# Patient Record
Sex: MALE | Race: WHITE | NOT HISPANIC OR LATINO | URBAN - METROPOLITAN AREA
[De-identification: names, ages, dates, MRNs, and addresses within clinical notes are randomized per-mention and may not be internally consistent; named-entity substitution may affect disease eponyms.]

---

## 2022-02-25 LAB
AMB EXT HGBA1C: 9.6 %
AMB EXT MALB URINE CALC: 38 MG/24HR
AMB EXT MALB/CRE CALC: 42 UG/MG

## 2022-07-09 ENCOUNTER — TELEPHONE ENCOUNTER (OUTPATIENT)
Dept: URBAN - METROPOLITAN AREA CLINIC 121 | Facility: CLINIC | Age: 82
End: 2022-07-09

## 2022-07-09 RX ORDER — ATENOLOL 50 MG/1
TABLET ORAL ONCE A DAY
Refills: 0 | OUTPATIENT
Start: 2017-11-28 | End: 2018-01-25

## 2022-07-09 RX ORDER — ATORVASTATIN CALCIUM 20 MG/1
TABLET, FILM COATED ORAL ONCE A DAY
Refills: 0 | OUTPATIENT
Start: 2017-11-28 | End: 2018-01-25

## 2022-07-09 RX ORDER — ATENOLOL 50 MG/1
TABLET ORAL ONCE A DAY
Refills: 0 | OUTPATIENT
Start: 2018-01-25 | End: 2018-04-18

## 2022-07-09 RX ORDER — TRIAMTERENE AND HYDROCHLOROTHIAZIDE 37.5; 25 MG/1; MG/1
CAPSULE ORAL ONCE A DAY
Refills: 0 | OUTPATIENT
Start: 2018-01-25 | End: 2018-04-18

## 2022-07-09 RX ORDER — ATORVASTATIN CALCIUM 20 MG/1
TABLET, FILM COATED ORAL ONCE A DAY
Refills: 0 | OUTPATIENT
Start: 2018-01-25 | End: 2018-04-18

## 2022-07-09 RX ORDER — ASPIRIN 81 MG/1
TABLET, DELAYED RELEASE ORAL ONCE A DAY
Refills: 0 | OUTPATIENT
Start: 2017-11-28 | End: 2018-01-25

## 2022-07-09 RX ORDER — GLIMEPIRIDE 2 MG/1
TABLET ORAL ONCE A DAY
Refills: 0 | OUTPATIENT
Start: 2017-11-28 | End: 2018-01-25

## 2022-07-09 RX ORDER — GLIMEPIRIDE 2 MG/1
TABLET ORAL ONCE A DAY
Refills: 0 | OUTPATIENT
Start: 2018-01-25 | End: 2018-04-18

## 2022-07-09 RX ORDER — TRIAMTERENE AND HYDROCHLOROTHIAZIDE 37.5; 25 MG/1; MG/1
CAPSULE ORAL ONCE A DAY
Refills: 0 | OUTPATIENT
Start: 2017-11-28 | End: 2018-01-25

## 2022-07-09 RX ORDER — METFORMIN HCL 1000 MG/1
TABLET ORAL ONCE A DAY
Refills: 0 | OUTPATIENT
Start: 2017-11-28 | End: 2018-01-25

## 2022-07-09 RX ORDER — METFORMIN HCL 1000 MG/1
TABLET ORAL ONCE A DAY
Refills: 0 | OUTPATIENT
Start: 2018-01-25 | End: 2018-04-18

## 2022-07-09 RX ORDER — LINAGLIPTIN 5 MG/1
TABLET, FILM COATED ORAL ONCE A DAY
Refills: 0 | OUTPATIENT
Start: 2018-01-25 | End: 2018-04-18

## 2022-07-09 RX ORDER — OMEGA-3S/DHA/EPA/FISH OIL 980-1400MG
CAPSULE,DELAYED RELEASE (ENTERIC COATED) ORAL THREE TIMES A DAY
Refills: 0 | OUTPATIENT
Start: 2018-01-25 | End: 2018-04-18

## 2022-07-09 RX ORDER — OMEGA-3S/DHA/EPA/FISH OIL 980-1400MG
CAPSULE,DELAYED RELEASE (ENTERIC COATED) ORAL THREE TIMES A DAY
Refills: 0 | OUTPATIENT
Start: 2017-11-28 | End: 2018-01-25

## 2022-07-09 RX ORDER — ASPIRIN 81 MG/1
TABLET, DELAYED RELEASE ORAL ONCE A DAY
Refills: 0 | OUTPATIENT
Start: 2018-01-25 | End: 2018-04-18

## 2022-07-09 RX ORDER — LINAGLIPTIN 5 MG/1
TABLET, FILM COATED ORAL ONCE A DAY
Refills: 0 | OUTPATIENT
Start: 2017-11-28 | End: 2018-01-25

## 2022-07-10 ENCOUNTER — TELEPHONE ENCOUNTER (OUTPATIENT)
Dept: URBAN - METROPOLITAN AREA CLINIC 121 | Facility: CLINIC | Age: 82
End: 2022-07-10

## 2022-07-10 RX ORDER — ATORVASTATIN CALCIUM 20 MG/1
TABLET, FILM COATED ORAL ONCE A DAY
Refills: 0 | Status: ACTIVE | COMMUNITY
Start: 2018-04-18

## 2022-07-10 RX ORDER — OMEGA-3S/DHA/EPA/FISH OIL 980-1400MG
CAPSULE,DELAYED RELEASE (ENTERIC COATED) ORAL THREE TIMES A DAY
Refills: 0 | Status: ACTIVE | COMMUNITY
Start: 2018-04-18

## 2022-07-10 RX ORDER — ATENOLOL 50 MG/1
TABLET ORAL ONCE A DAY
Refills: 0 | Status: ACTIVE | COMMUNITY
Start: 2018-04-18

## 2022-07-10 RX ORDER — ASPIRIN 81 MG/1
TABLET, DELAYED RELEASE ORAL ONCE A DAY
Refills: 0 | Status: ACTIVE | COMMUNITY
Start: 2018-04-18

## 2022-07-10 RX ORDER — GLIMEPIRIDE 2 MG/1
TABLET ORAL ONCE A DAY
Refills: 0 | Status: ACTIVE | COMMUNITY
Start: 2018-04-18

## 2022-07-10 RX ORDER — TRIAMTERENE AND HYDROCHLOROTHIAZIDE 37.5; 25 MG/1; MG/1
CAPSULE ORAL ONCE A DAY
Refills: 0 | Status: ACTIVE | COMMUNITY
Start: 2018-04-18

## 2022-07-10 RX ORDER — METFORMIN HCL 1000 MG/1
TABLET ORAL ONCE A DAY
Refills: 0 | Status: ACTIVE | COMMUNITY
Start: 2018-04-18

## 2022-07-10 RX ORDER — LINAGLIPTIN 5 MG/1
TABLET, FILM COATED ORAL ONCE A DAY
Refills: 0 | Status: ACTIVE | COMMUNITY
Start: 2018-04-18

## 2022-09-08 ENCOUNTER — OFFICE VISIT (OUTPATIENT)
Dept: FAMILY MEDICINE CLINIC | Facility: CLINIC | Age: 82
End: 2022-09-08

## 2022-09-08 VITALS
TEMPERATURE: 98 F | DIASTOLIC BLOOD PRESSURE: 78 MMHG | SYSTOLIC BLOOD PRESSURE: 150 MMHG | HEIGHT: 71.5 IN | RESPIRATION RATE: 18 BRPM | HEART RATE: 86 BPM | BODY MASS INDEX: 31.5 KG/M2 | OXYGEN SATURATION: 98 % | WEIGHT: 230 LBS

## 2022-09-08 DIAGNOSIS — Z76.89 ENCOUNTER TO ESTABLISH CARE: ICD-10-CM

## 2022-09-08 DIAGNOSIS — E78.5 HYPERLIPIDEMIA, UNSPECIFIED HYPERLIPIDEMIA TYPE: ICD-10-CM

## 2022-09-08 DIAGNOSIS — I10 PRIMARY HYPERTENSION: Primary | ICD-10-CM

## 2022-09-08 DIAGNOSIS — E11.9 TYPE 2 DIABETES MELLITUS WITHOUT COMPLICATION, WITHOUT LONG-TERM CURRENT USE OF INSULIN (HCC): ICD-10-CM

## 2022-09-08 PROCEDURE — 99204 OFFICE O/P NEW MOD 45 MIN: CPT | Performed by: FAMILY MEDICINE

## 2022-09-08 RX ORDER — ASCORBIC ACID 500 MG
TABLET ORAL
COMMUNITY

## 2022-09-08 RX ORDER — FENOFIBRATE 145 MG/1
145 TABLET, COATED ORAL DAILY
COMMUNITY
Start: 2022-08-16

## 2022-09-08 RX ORDER — ATORVASTATIN CALCIUM 20 MG/1
20 TABLET, FILM COATED ORAL NIGHTLY
COMMUNITY
Start: 2022-07-25

## 2022-09-08 RX ORDER — ASPIRIN 325 MG
325 TABLET ORAL DAILY
COMMUNITY

## 2022-09-08 RX ORDER — AMOXICILLIN 500 MG
CAPSULE ORAL
COMMUNITY

## 2022-09-08 RX ORDER — ISOSORBIDE MONONITRATE 30 MG/1
30 TABLET, EXTENDED RELEASE ORAL DAILY
COMMUNITY
Start: 2022-09-01

## 2022-09-08 RX ORDER — METOPROLOL SUCCINATE 50 MG/1
50 TABLET, EXTENDED RELEASE ORAL DAILY
COMMUNITY
Start: 2022-08-21

## 2022-09-08 RX ORDER — MELATONIN
325
COMMUNITY

## 2022-09-08 RX ORDER — LEVOTHYROXINE SODIUM 0.03 MG/1
25 TABLET ORAL
COMMUNITY
Start: 2022-07-18

## 2022-09-08 RX ORDER — AMLODIPINE BESYLATE 5 MG/1
5 TABLET ORAL DAILY
COMMUNITY
Start: 2022-08-21

## 2022-09-08 RX ORDER — TRIAMTERENE AND HYDROCHLOROTHIAZIDE 37.5; 25 MG/1; MG/1
37.5 TABLET ORAL DAILY
COMMUNITY
Start: 2022-08-21

## 2022-09-08 RX ORDER — GLIPIZIDE 10 MG/1
10 TABLET ORAL 2 TIMES DAILY
COMMUNITY
Start: 2022-08-16

## 2022-09-19 ENCOUNTER — TELEPHONE (OUTPATIENT)
Dept: FAMILY MEDICINE CLINIC | Facility: CLINIC | Age: 82
End: 2022-09-19

## 2022-09-19 NOTE — TELEPHONE ENCOUNTER
Spouse notified. She does not know if he has ever been on lisinopril or losartan. Leaving for [place] in October. OV scheduled for labs and discuss meds.     Future Appointments   Date Time Provider Celsa Schreiber   10/3/2022  9:20 AM López Kelley MD [facility]FREDO Arias

## 2022-09-19 NOTE — TELEPHONE ENCOUNTER
Pt had bloodwork on June 15th @ [facility].  [facility] was going to send the results to PCP Dr Khushboo Molina in [place]. Pt called Dr Gabriel's office, they have not received the the bloodwork from [address] [place] [address],  [facility] [place]note 2] [address] called [employer], they have no record of it lab results. Pt's wife wants to know if we received labs from Dr Gabriel's office? ?

## 2022-09-19 NOTE — TELEPHONE ENCOUNTER
I got his labs his hba1c is high. His other labs shows cholesterol level normal. Liver and thyroid normal. B12 folate and iron normal. Kidney function mild elevated but stable. Blood count is normal.  I would recommend strict low carb diet and exercise. He can repeat hba1c in 1 month and if still elevated then consider adding meds. Also his urine albumin level mild elevated and  he is not on any ace1 for kidney protection in diabetis. Can you ask him if has been on any losartan lisinopril in past and any reason it was stopped. ? If not then I would recommend adding a acei low dose for kindye protection. If he is willing to start losartan schedule ov with me.

## 2022-10-03 ENCOUNTER — OFFICE VISIT (OUTPATIENT)
Dept: FAMILY MEDICINE CLINIC | Facility: CLINIC | Age: 82
End: 2022-10-03
Payer: MEDICARE

## 2022-10-03 VITALS
SYSTOLIC BLOOD PRESSURE: 120 MMHG | OXYGEN SATURATION: 98 % | BODY MASS INDEX: 32.2 KG/M2 | RESPIRATION RATE: 18 BRPM | WEIGHT: 230 LBS | HEART RATE: 106 BPM | HEIGHT: 71 IN | TEMPERATURE: 98 F | DIASTOLIC BLOOD PRESSURE: 80 MMHG

## 2022-10-03 DIAGNOSIS — E11.9 TYPE 2 DIABETES MELLITUS WITHOUT COMPLICATION, WITHOUT LONG-TERM CURRENT USE OF INSULIN (HCC): Primary | ICD-10-CM

## 2022-10-03 PROCEDURE — 99214 OFFICE O/P EST MOD 30 MIN: CPT | Performed by: FAMILY MEDICINE

## 2022-10-03 RX ORDER — LISINOPRIL 2.5 MG/1
2.5 TABLET ORAL DAILY
Qty: 90 TABLET | Refills: 1 | Status: SHIPPED | OUTPATIENT
Start: 2022-10-03

## 2022-11-28 RX ORDER — FENOFIBRATE 145 MG/1
145 TABLET, COATED ORAL DAILY
Qty: 90 TABLET | Refills: 0 | Status: SHIPPED | OUTPATIENT
Start: 2022-11-28

## 2022-11-28 NOTE — TELEPHONE ENCOUNTER
Cholesterol Medication Protocol Failed 11/28/2022 09:16 AM   Protocol Details  ALT < 80    ALT resulted within past year    Lipid panel within past 12 months    Appointment within past 12 or next 3 months     Diabetic Medication Protocol Failed 11/28/2022 09:16 AM   Protocol Details  HgBA1C procedure resulted in past 6 months    Last HgBA1C < 7.5    Microalbumin procedure in past 12 months or taking ACE/ARB    Appointment in past 6 or next 3 months     Labs scanned to chart from 2/23/22. We have never sent these - ok to send? No future appointments.

## 2022-12-09 RX ORDER — GLIPIZIDE 10 MG/1
10 TABLET ORAL 2 TIMES DAILY
Qty: 180 TABLET | Refills: 0 | Status: SHIPPED | OUTPATIENT
Start: 2022-12-09

## 2022-12-09 NOTE — TELEPHONE ENCOUNTER
glipiZIDE 10 MG Oral Tab  [address] [facility], [place] - [address] [facility] AT [address] [facility], [phone],

## 2022-12-09 NOTE — TELEPHONE ENCOUNTER
Last visit 10/03/2022  Last refill 08/16/2022   Diabetic Medication Protocol Failed 12/09/2022 09:16 AM   Protocol Details  HgBA1C procedure resulted in past 6 months    Last HgBA1C < 7.5    Microalbumin procedure in past 12 months or taking ACE/ARB    Appointment in past 6 or next 3 months

## 2022-12-12 RX ORDER — FENOFIBRATE 145 MG/1
TABLET, COATED ORAL
Qty: 90 TABLET | Refills: 0 | OUTPATIENT
Start: 2022-12-12

## 2022-12-12 NOTE — TELEPHONE ENCOUNTER
Cholesterol Medication Protocol Failed 12/11/2022 08:22 AM   Protocol Details  ALT < 80    ALT resulted within past year    Lipid panel within past 12 months    Appointment within past 12 or next 3 months     Last refill 11/28/22 #90  This request refused

## 2022-12-30 LAB
AMB EXT CHLORIDE: 97
AMB EXT CREATININE: 1.49 MG/DL
AMB EXT GLUCOSE: 322 MG/DL
AMB EXT GLUCOSE: 322 MG/DL
AMB EXT HGBA1C: 10.7 %
AMB EXT SODIUM: 133 MMOL/L

## 2023-01-04 ENCOUNTER — TELEPHONE (OUTPATIENT)
Dept: FAMILY MEDICINE CLINIC | Facility: CLINIC | Age: 83
End: 2023-01-04

## 2023-01-04 DIAGNOSIS — E11.9 TYPE 2 DIABETES MELLITUS WITHOUT COMPLICATION, WITHOUT LONG-TERM CURRENT USE OF INSULIN (HCC): Primary | ICD-10-CM

## 2023-01-04 RX ORDER — INSULIN GLARGINE 100 [IU]/ML
10 INJECTION, SOLUTION SUBCUTANEOUS NIGHTLY
Qty: 10 ML | Refills: 0 | Status: SHIPPED | OUTPATIENT
Start: 2023-01-04 | End: 2023-01-04

## 2023-01-04 RX ORDER — INSULIN GLARGINE 100 [IU]/ML
10 INJECTION, SOLUTION SUBCUTANEOUS NIGHTLY
Qty: 15 ML | Refills: 0 | Status: SHIPPED | OUTPATIENT
Start: 2023-01-04

## 2023-01-04 RX ORDER — BLOOD-GLUCOSE METER
1 KIT MISCELLANEOUS 3 TIMES DAILY
Qty: 1 KIT | Refills: 0 | Status: SHIPPED | OUTPATIENT
Start: 2023-01-04 | End: 2024-01-04

## 2023-01-04 RX ORDER — BLOOD-GLUCOSE METER
1 KIT MISCELLANEOUS 3 TIMES DAILY
Qty: 1 KIT | Refills: 0 | COMMUNITY
Start: 2023-01-04 | End: 2023-01-04

## 2023-01-04 RX ORDER — BLOOD SUGAR DIAGNOSTIC
STRIP MISCELLANEOUS
Qty: 300 STRIP | Refills: 1 | Status: SHIPPED | OUTPATIENT
Start: 2023-01-04 | End: 2024-01-04

## 2023-01-04 RX ORDER — LANCETS 33 GAUGE
1 EACH MISCELLANEOUS 3 TIMES DAILY
Qty: 300 EACH | Refills: 1 | Status: SHIPPED | OUTPATIENT
Start: 2023-01-04 | End: 2024-01-04

## 2023-01-04 NOTE — TELEPHONE ENCOUNTER
Fax received from [facility] stating Lantus is not covered. Alternatives:  Erma العلي Feeling Flextouch  Levemir Flextouch    Please advise. I pended Erma with the same sig if that is ok?   [facility] in [facility]

## 2023-01-04 NOTE — TELEPHONE ENCOUNTER
Patient advised. Verbalized understanding.    States doesn't see any other provides, only Dr Kulwinder Morin  Would like Dr Kulwinder Morin to send medications  Pharmacy updated-is currently in [place] until may  Please advise

## 2023-01-04 NOTE — TELEPHONE ENCOUNTER
Please inform patient hba1c is 10. 7. worse since last time. I highly recommend to start another meds. Any possibility he can see another pcp in [place] or endocrine in [place]. He will need to be started on another meds possibly insulin since his insurane will not cover newer diabetic meds. And if he need to start on insulin then he will need someone to teach him how to use it so I recommend seeing endocrine or pcp in [place]. If that is not possible then I can send insulin and diabetic supply and he can ask pharmacist to show him how to use it. Other labs looks stable.

## 2023-01-04 NOTE — TELEPHONE ENCOUNTER
I did sent insulin start with 10 units subcutaneousat night time. Please ask pharmacist on how to use it. Also can you send him diabetic supplies if he does not have one and he need to check blood sugar 3 times a day and keep log. Call office in 1 wk with his numbers. If he has any hypoglycemia episode like sweating, palpitation dizzy then check blood sugar if less then 70 have some orange juice or cookie or some sugar candy or glucose tablet and repeat blood sugar and call office. If blood sugar stay consistently low call 911. Can you put reminder to call this patient next Thursday to get update on his blood sugar.

## 2023-01-04 NOTE — TELEPHONE ENCOUNTER
Fax received from pharmacy stating 1 full box = 15mL and they need a new Rx to reflect that. Rx sent for 15mL of Basaglar.

## 2023-01-06 ENCOUNTER — TELEPHONE (OUTPATIENT)
Dept: FAMILY MEDICINE CLINIC | Facility: CLINIC | Age: 83
End: 2023-01-06

## 2023-01-06 RX ORDER — PEN NEEDLE, DIABETIC 29 G X1/2"
NEEDLE, DISPOSABLE MISCELLANEOUS
Qty: 100 EACH | Refills: 1 | Status: SHIPPED | OUTPATIENT
Start: 2023-01-06 | End: 2023-01-09

## 2023-01-09 ENCOUNTER — TELEPHONE (OUTPATIENT)
Dept: FAMILY MEDICINE CLINIC | Facility: CLINIC | Age: 83
End: 2023-01-09

## 2023-01-09 RX ORDER — PEN NEEDLE, DIABETIC 29 G X1/2"
NEEDLE, DISPOSABLE MISCELLANEOUS
Qty: 100 EACH | Refills: 1 | Status: SHIPPED | OUTPATIENT
Start: 2023-01-09

## 2023-01-10 ENCOUNTER — TELEPHONE (OUTPATIENT)
Dept: FAMILY MEDICINE CLINIC | Facility: CLINIC | Age: 83
End: 2023-01-10

## 2023-01-10 NOTE — TELEPHONE ENCOUNTER
Patient states that just got his lancets and is moving right now so hasn't been checking sugars.  States does have all supplies now and will start checking

## 2023-01-10 NOTE — TELEPHONE ENCOUNTER
I called patient left a voicemail to call us back. Per Dr Maria C Chin she would like to know how are his blood sugars doing at home reading.

## 2023-01-20 ENCOUNTER — TELEPHONE (OUTPATIENT)
Dept: FAMILY MEDICINE CLINIC | Facility: CLINIC | Age: 83
End: 2023-01-20

## 2023-01-20 NOTE — TELEPHONE ENCOUNTER
Started insulin glargine  Sunday  10 units at bedtime  Fasting blood sugar this morning 298  Yesterday 251  Tuesday 226  Monday 261  No symptoms of frequent thirst  Denies increased urination  Denies dizziness  Denies sweating  Please advise if insulin dose should be adjusted

## 2023-01-20 NOTE — TELEPHONE ENCOUNTER
Spoke with pt  Pt notify   He will  Call us on Monday with his reading and follow up with Dr. Matt Daigle  when pt comes back from [place]. Pt verbalized understanding.

## 2023-01-20 NOTE — TELEPHONE ENCOUNTER
PATIENT WAS PUT ON INSULIN LAST Sunday AND DOES NOT SEE IMPROVEMENTS IN READINGS.  TAKES INSULIN AT NIGHT THEN IN MORNING HE DOES HIS BLOOD SUGAR   298 THIS MORNING  251 YESTERDAY MORNING

## 2023-01-20 NOTE — TELEPHONE ENCOUNTER
Increase lantus to 14 units QHS x 3 days then call back with readings  Due for f/u with [facility]

## 2023-01-23 RX ORDER — INSULIN GLARGINE 100 [IU]/ML
16 INJECTION, SOLUTION SUBCUTANEOUS NIGHTLY
Qty: 15 ML | Refills: 0 | COMMUNITY
Start: 2023-01-23

## 2023-01-23 NOTE — TELEPHONE ENCOUNTER
Increase to 16 units at bedtime and check fasting blood surgar and call office on Saturday with readings. If he has any hypoglycemic episode like tremors numbness tingling dizzy then call office sooner.

## 2023-01-23 NOTE — TELEPHONE ENCOUNTER
PATIENT CALLING WITH BLOOD SUGAR RESULTS AFTER INCREASED INSULIN   SAT- 1 [facility]  SUN-[id number]  MON-215

## 2023-01-27 ENCOUNTER — TELEPHONE (OUTPATIENT)
Dept: FAMILY MEDICINE CLINIC | Facility: CLINIC | Age: 83
End: 2023-01-27

## 2023-01-27 NOTE — TELEPHONE ENCOUNTER
Pt calling said he was told to call in to give update on sugar. Per pt he started medication on the 23rd. Per pt he checks sugar in the morning .      01/24- 225 01/25- 227 01/26 -216 01/27- 224

## 2023-01-28 RX ORDER — INSULIN GLARGINE 100 [IU]/ML
20 INJECTION, SOLUTION SUBCUTANEOUS NIGHTLY
Qty: 15 ML | Refills: 0 | COMMUNITY
Start: 2023-01-28

## 2023-01-28 NOTE — TELEPHONE ENCOUNTER
Increase dose of insulin to 20 units at night time and check bs in morning and call office next Thursday with blood sugar reading  If any hypoglycemia symptoms and bs below 70 drink orange juice or glucose tab/cookies and check blood sugar and still below 70 then call 911.

## 2023-02-02 ENCOUNTER — TELEPHONE (OUTPATIENT)
Dept: FAMILY MEDICINE CLINIC | Facility: CLINIC | Age: 83
End: 2023-02-02

## 2023-02-02 DIAGNOSIS — E11.9 TYPE 2 DIABETES MELLITUS WITHOUT COMPLICATION, WITHOUT LONG-TERM CURRENT USE OF INSULIN (HCC): Primary | ICD-10-CM

## 2023-02-02 RX ORDER — INSULIN GLARGINE 100 [IU]/ML
24 INJECTION, SOLUTION SUBCUTANEOUS NIGHTLY
Qty: 15 ML | Refills: 0 | COMMUNITY
Start: 2023-02-02

## 2023-02-02 NOTE — TELEPHONE ENCOUNTER
Pt calling to give update on sugar levels. Per pt he checks it in the morning.    01/29- 191 01/30- 205 01/31- 208 02/01- 220 02/2 -204

## 2023-02-02 NOTE — TELEPHONE ENCOUNTER
Ok to increase to 24 at bedtime. Check bs in morning. Also I did place order for hba1c to be done end of march.

## 2023-02-02 NOTE — TELEPHONE ENCOUNTER
Pt called said he is going to be in [place] till may.  [id number] [address] for him to have them done in [place] pt would like the orders mail to Mathew Quintero [id number]

## 2023-02-02 NOTE — TELEPHONE ENCOUNTER
Patient states no issue with hypoglycemia  Fasting blood sugar morning reads with 20 units of lantus at bedtime  01/29- 191 01/30- 205 01/31- 208 02/01- 220 02/2 -204

## 2023-02-09 ENCOUNTER — TELEPHONE (OUTPATIENT)
Dept: FAMILY MEDICINE CLINIC | Facility: CLINIC | Age: 83
End: 2023-02-09

## 2023-02-09 NOTE — TELEPHONE ENCOUNTER
Blood sugar still mild elevated but will recommend get hba1c checked now and depending on result we will make changes with dosing

## 2023-02-09 NOTE — TELEPHONE ENCOUNTER
YOGII  Patient advised and verbalized understanding  Patient states he has his appointment next month with shilpa.

## 2023-02-09 NOTE — TELEPHONE ENCOUNTER
Condition Update  Blood Sugar for the Last 7 days.   Pt took first thing in the morning,   they are Fasting     2/3/23-  229  2/4/23   237  2/5/23   219  2/6/23   192  2/7/23   231  2/8/23   190  2/9/23   203

## 2023-02-20 NOTE — TELEPHONE ENCOUNTER
Diabetic Medication Protocol Failed 02/20/2023 09:31 AM   Protocol Details  Last HgBA1C < 7.5    HgBA1C procedure resulted in past 6 months    Microalbumin procedure in past 12 months or taking ACE/ARB    Appointment in past 6 or next 3 months     a1c 10.7 on 12/29/22  Last OV 10/3/22  No future appointments.

## 2023-02-28 RX ORDER — LEVOTHYROXINE SODIUM 0.03 MG/1
25 TABLET ORAL
Qty: 90 TABLET | Refills: 0 | Status: SHIPPED | OUTPATIENT
Start: 2023-02-28

## 2023-02-28 RX ORDER — METOPROLOL SUCCINATE 50 MG/1
50 TABLET, EXTENDED RELEASE ORAL DAILY
Qty: 90 TABLET | Refills: 0 | Status: SHIPPED | OUTPATIENT
Start: 2023-02-28

## 2023-02-28 RX ORDER — FENOFIBRATE 145 MG/1
145 TABLET, COATED ORAL DAILY
Qty: 90 TABLET | Refills: 0 | Status: SHIPPED | OUTPATIENT
Start: 2023-02-28

## 2023-02-28 NOTE — TELEPHONE ENCOUNTER
Rx refill requested     fenofibrate 145 MG Oral Tab     levothyroxine 25 MCG Oral Tab     metoprolol succinate ER 50 MG Oral Tablet 24 Hr     [id number] [id number] [facility] [address] AT [address], [phone], [phone]

## 2023-03-08 NOTE — TELEPHONE ENCOUNTER
LOV 10/3/22 for DM. A1C on 12/29/22 was 10.7 per scanned results. Diabetic Medication Protocol Failed 03/07/2023 11:07 PM   Protocol Details  Last HgBA1C < 7.5    HgBA1C procedure resulted in past 6 months    Microalbumin procedure in past 12 months or taking ACE/ARB    Appointment in past 6 or next 3 months        No future appointments. Please advise.

## 2023-03-09 RX ORDER — GLIPIZIDE 10 MG/1
TABLET ORAL
Qty: 180 TABLET | Refills: 0 | Status: SHIPPED | OUTPATIENT
Start: 2023-03-09

## 2023-03-20 ENCOUNTER — TELEPHONE (OUTPATIENT)
Dept: FAMILY MEDICINE CLINIC | Facility: CLINIC | Age: 83
End: 2023-03-20

## 2023-03-30 LAB — AMB EXT HGBA1C: 9.8 %

## 2023-03-31 ENCOUNTER — TELEPHONE (OUTPATIENT)
Dept: FAMILY MEDICINE CLINIC | Facility: CLINIC | Age: 83
End: 2023-03-31

## 2023-03-31 DIAGNOSIS — E11.9 TYPE 2 DIABETES MELLITUS WITHOUT COMPLICATION, WITHOUT LONG-TERM CURRENT USE OF INSULIN (HCC): ICD-10-CM

## 2023-03-31 NOTE — TELEPHONE ENCOUNTER
Received hgb A1C records from [facility]   Test done 3/30/23  HGB A1C 9.8  Result added to patient's chart

## 2023-04-01 RX ORDER — LISINOPRIL 2.5 MG/1
TABLET ORAL
Qty: 90 TABLET | Refills: 0 | Status: SHIPPED | OUTPATIENT
Start: 2023-04-01

## 2023-04-01 NOTE — TELEPHONE ENCOUNTER
Hypertension Medications Protocol Passed 03/31/2023 11:20 PM   Protocol Details  CMP or BMP in past 12 months    Last serum creatinine< 2.0    Appointment in past 6 or next 3 months     Rx sent.

## 2023-04-06 ENCOUNTER — TELEPHONE (OUTPATIENT)
Dept: FAMILY MEDICINE CLINIC | Facility: CLINIC | Age: 83
End: 2023-04-06

## 2023-04-06 DIAGNOSIS — E11.9 TYPE 2 DIABETES MELLITUS WITHOUT COMPLICATION, WITHOUT LONG-TERM CURRENT USE OF INSULIN (HCC): Primary | ICD-10-CM

## 2023-04-06 DIAGNOSIS — E78.5 HYPERLIPIDEMIA, UNSPECIFIED HYPERLIPIDEMIA TYPE: ICD-10-CM

## 2023-04-06 RX ORDER — EMPAGLIFLOZIN 25 MG/1
1 TABLET, FILM COATED ORAL DAILY
Qty: 90 TABLET | Refills: 0 | Status: SHIPPED | OUTPATIENT
Start: 2023-04-06

## 2023-04-06 NOTE — TELEPHONE ENCOUNTER
Please inform him his hba1c is still elevated 9.8 but improved since last time. I have added another medication jardiace to his regiment. Usually safe if any gi upset will resolve in few days. If insurance is not covering please let me know. Also can you ask him when he is coming back from [place]? Schedule ov with me when he is here. Repeat labs in 3months.

## 2023-04-06 NOTE — TELEPHONE ENCOUNTER
Patient advised and verbalized understanding.   Patient coming back to [place] next week and will schedule  Reminder for hgb a1c placed

## 2023-05-02 ENCOUNTER — PATIENT OUTREACH (OUTPATIENT)
Dept: FAMILY MEDICINE CLINIC | Facility: CLINIC | Age: 83
End: 2023-05-02

## 2023-05-09 DIAGNOSIS — E11.9 TYPE 2 DIABETES MELLITUS WITHOUT COMPLICATION, WITHOUT LONG-TERM CURRENT USE OF INSULIN (HCC): ICD-10-CM

## 2023-05-09 NOTE — TELEPHONE ENCOUNTER
Rx refill requested     LISINOPRIL 2.5 MG Oral Tab     [id number] - [place], [address] [facility], [phone]

## 2023-05-09 NOTE — TELEPHONE ENCOUNTER
Last visit 10/03/2022  Last refill 04/01/2023     Your appointments     Date & Time Appointment Department [facility])    Aug 24, 2023  9:40 AM CDT Medicare Annual Well Visit with Jessica Chang MD [address], [facility], [place] ([address])

## 2023-05-10 RX ORDER — LISINOPRIL 2.5 MG/1
2.5 TABLET ORAL DAILY
Qty: 90 TABLET | Refills: 0 | Status: SHIPPED | OUTPATIENT
Start: 2023-05-10

## 2023-05-22 RX ORDER — TRIAMTERENE AND HYDROCHLOROTHIAZIDE 37.5; 25 MG/1; MG/1
37.5 TABLET ORAL DAILY
Qty: 90 TABLET | Refills: 0 | Status: CANCELLED | OUTPATIENT
Start: 2023-05-22

## 2023-05-22 RX ORDER — LEVOTHYROXINE SODIUM 0.03 MG/1
25 TABLET ORAL
Qty: 90 TABLET | Refills: 0 | Status: SHIPPED | OUTPATIENT
Start: 2023-05-22

## 2023-05-22 RX ORDER — FENOFIBRATE 145 MG/1
145 TABLET, COATED ORAL DAILY
Qty: 90 TABLET | Refills: 0 | Status: SHIPPED | OUTPATIENT
Start: 2023-05-22

## 2023-05-22 RX ORDER — AMLODIPINE BESYLATE 5 MG/1
5 TABLET ORAL DAILY
Qty: 90 TABLET | Refills: 0 | Status: SHIPPED | OUTPATIENT
Start: 2023-05-22

## 2023-05-22 RX ORDER — ATORVASTATIN CALCIUM 20 MG/1
20 TABLET, FILM COATED ORAL NIGHTLY
Qty: 90 TABLET | Refills: 0 | Status: SHIPPED | OUTPATIENT
Start: 2023-05-22

## 2023-05-22 RX ORDER — METOPROLOL SUCCINATE 50 MG/1
50 TABLET, EXTENDED RELEASE ORAL DAILY
Qty: 90 TABLET | Refills: 0 | Status: SHIPPED | OUTPATIENT
Start: 2023-05-22

## 2023-05-22 NOTE — TELEPHONE ENCOUNTER
metoprolol succinate ER 50 MG Oral Tablet 24 Hr  levothyroxine 25 MCG Oral Tab  fenofibrate 145 MG Oral Tab  METFORMIN HCL 1000 MG Oral Tab    Following Rx has not been filled with Dr Isabella Alejandra- was filled with pt's previous PCP.    amLODIPine 5 MG Oral Tab-   atorvastatin 20 MG Oral Tab  Triamterene-HCTZ 37.5-25 MG Oral Tab    Pt is leaving for vacation this coming Wednesday

## 2023-05-22 NOTE — TELEPHONE ENCOUNTER
Pt was supposed to see [facility] in April for a MAW and labs. They scheduled this for August.  Ok to send or needs to be seen sooner?     Future Appointments   Date Time Provider Celsa Schreiber   8/24/2023  9:40 AM Kash Otoole MD [facility] [place]

## 2023-05-23 DIAGNOSIS — E11.9 TYPE 2 DIABETES MELLITUS WITHOUT COMPLICATION, WITHOUT LONG-TERM CURRENT USE OF INSULIN (HCC): ICD-10-CM

## 2023-05-23 RX ORDER — INSULIN GLARGINE 100 [IU]/ML
24 INJECTION, SOLUTION SUBCUTANEOUS NIGHTLY
Qty: 25 ML | Refills: 0 | Status: SHIPPED | OUTPATIENT
Start: 2023-05-23 | End: 2023-08-21

## 2023-05-23 NOTE — TELEPHONE ENCOUNTER
Rx refill requested     insulin glargine [facility]) 100 UNIT/ML Subcutaneous Solution Pen-injector     [facility] [address] [facility] - [place], [address], [phone]

## 2023-05-26 RX ORDER — TRIAMTERENE AND HYDROCHLOROTHIAZIDE 37.5; 25 MG/1; MG/1
1 TABLET ORAL DAILY
Qty: 90 TABLET | Refills: 0 | Status: SHIPPED | OUTPATIENT
Start: 2023-05-26

## 2023-05-26 NOTE — TELEPHONE ENCOUNTER
Triamterene-HCTZ 37.5-25 MG Oral Tab  Pt did not receive this RX.  He only has 5 pills left    Original refill request on 5/22/23   Send to [facility] in [place]   He has an AWV scheduled on 8/24/23

## 2023-06-05 RX ORDER — GLIPIZIDE 10 MG/1
TABLET ORAL
Qty: 180 TABLET | Refills: 0 | Status: SHIPPED | OUTPATIENT
Start: 2023-06-05

## 2023-06-08 RX ORDER — GLIPIZIDE 10 MG/1
10 TABLET ORAL 2 TIMES DAILY
Qty: 180 TABLET | Refills: 0 | Status: SHIPPED | OUTPATIENT
Start: 2023-06-08

## 2023-06-08 NOTE — TELEPHONE ENCOUNTER
Pt requesting refill for GLIPIZIDE 10 MG Oral Tab at [facility] [address] [facility] [employer] - [place] Becker, [address]

## 2023-06-08 NOTE — TELEPHONE ENCOUNTER
Patient has an appointment scheduled  Future Appointments   Date Time Provider Celsa Schreiber   8/24/2023  9:40 AM Jacqueline Valerio MD [facility]     Bridging sent per protocol  Last A1C in April - patient not due yet

## 2023-07-01 ENCOUNTER — TELEPHONE (OUTPATIENT)
Dept: FAMILY MEDICINE CLINIC | Facility: CLINIC | Age: 83
End: 2023-07-01

## 2023-07-01 LAB — A1C: 9.8 %

## 2023-07-04 DIAGNOSIS — E11.9 TYPE 2 DIABETES MELLITUS WITHOUT COMPLICATION, WITHOUT LONG-TERM CURRENT USE OF INSULIN (HCC): ICD-10-CM

## 2023-07-05 RX ORDER — LISINOPRIL 2.5 MG/1
TABLET ORAL
Qty: 90 TABLET | Refills: 0 | Status: SHIPPED | OUTPATIENT
Start: 2023-07-05

## 2023-07-05 NOTE — TELEPHONE ENCOUNTER
Hypertension Medications Protocol Gmhwsr7407/04/2023 11:55 PM   Protocol Details CMP or BMP in past 12 months    Last serum creatinine< 2.0    Appointment in past 6 or next 3 months

## 2023-07-18 DIAGNOSIS — E11.9 TYPE 2 DIABETES MELLITUS WITHOUT COMPLICATION, WITHOUT LONG-TERM CURRENT USE OF INSULIN (HCC): ICD-10-CM

## 2023-07-18 RX ORDER — INSULIN GLARGINE 100 [IU]/ML
24 INJECTION, SOLUTION SUBCUTANEOUS NIGHTLY
Qty: 25 ML | Refills: 0 | Status: SHIPPED | OUTPATIENT
Start: 2023-07-18 | End: 2023-10-30

## 2023-07-18 RX ORDER — PEN NEEDLE, DIABETIC 29 G X1/2"
NEEDLE, DISPOSABLE MISCELLANEOUS
Qty: 100 EACH | Refills: 1 | Status: SHIPPED | OUTPATIENT
Start: 2023-07-18

## 2023-07-18 NOTE — TELEPHONE ENCOUNTER
Diabetic Supplies Protocol Passed      Last OV 10/3/22  Last refill 5/23/23 25mL 0 refill  Future Appointments   Date Time Provider [facility]   8/24/2023  9:40 AM Gema Holm MD [facility] [place]

## 2023-07-18 NOTE — TELEPHONE ENCOUNTER
insulin glargine (BASAGLAR KWIKPEN) 100 UNIT/ML Subcutaneous Solution Pen-injector     Insulin Pen Needle (BD PEN NEEDLE ORIGINAL U/F) 29G X 12.7MM     Pt is in [place]-   [address] [place] [address]

## 2023-08-18 NOTE — TELEPHONE ENCOUNTER
Last visit 10/03/2022  Last refill 05/22/2023   Cholesterol Medication Protocol Wkbyve2708/18/2023 12:47 AM   Protocol Details ALT < 80    ALT resulted within past year    Lipid panel within past 12 months    Appointment within past 12 or next 3 months

## 2023-08-21 RX ORDER — ATORVASTATIN CALCIUM 20 MG/1
20 TABLET, FILM COATED ORAL NIGHTLY
Qty: 90 TABLET | Refills: 3 | Status: SHIPPED | OUTPATIENT
Start: 2023-08-21

## 2023-08-23 RX ORDER — TRIAMTERENE AND HYDROCHLOROTHIAZIDE 37.5; 25 MG/1; MG/1
1 TABLET ORAL DAILY
Qty: 90 TABLET | Refills: 0 | Status: SHIPPED | OUTPATIENT
Start: 2023-08-23

## 2023-08-23 NOTE — TELEPHONE ENCOUNTER
Hypertension Medications Protocol Cyhnim5308/23/2023 12:39 AM   Protocol Details CMP or BMP in past 12 months    Last serum creatinine< 2.0    Appointment in past 6 or next 3 months

## 2023-08-24 ENCOUNTER — OFFICE VISIT (OUTPATIENT)
Dept: FAMILY MEDICINE CLINIC | Facility: CLINIC | Age: 83
End: 2023-08-24
Payer: MEDICARE

## 2023-08-24 DIAGNOSIS — I10 PRIMARY HYPERTENSION: ICD-10-CM

## 2023-08-24 DIAGNOSIS — Z00.00 ANNUAL PHYSICAL EXAM: Primary | ICD-10-CM

## 2023-08-24 DIAGNOSIS — E11.9 TYPE 2 DIABETES MELLITUS WITHOUT COMPLICATION, WITHOUT LONG-TERM CURRENT USE OF INSULIN (HCC): ICD-10-CM

## 2023-08-24 DIAGNOSIS — Z12.5 ENCOUNTER FOR SCREENING FOR MALIGNANT NEOPLASM OF PROSTATE: ICD-10-CM

## 2023-08-24 DIAGNOSIS — E78.5 HYPERLIPIDEMIA, UNSPECIFIED HYPERLIPIDEMIA TYPE: ICD-10-CM

## 2023-08-24 PROCEDURE — G0439 PPPS, SUBSEQ VISIT: HCPCS | Performed by: FAMILY MEDICINE

## 2023-08-25 DIAGNOSIS — E11.9 TYPE 2 DIABETES MELLITUS WITHOUT COMPLICATION, WITHOUT LONG-TERM CURRENT USE OF INSULIN (HCC): ICD-10-CM

## 2023-08-28 ENCOUNTER — LABORATORY ENCOUNTER (OUTPATIENT)
Dept: LAB | Age: 83
End: 2023-08-28
Attending: FAMILY MEDICINE
Payer: MEDICARE

## 2023-08-28 DIAGNOSIS — Z12.5 ENCOUNTER FOR SCREENING FOR MALIGNANT NEOPLASM OF PROSTATE: ICD-10-CM

## 2023-08-28 DIAGNOSIS — E11.9 TYPE 2 DIABETES MELLITUS WITHOUT COMPLICATION, WITHOUT LONG-TERM CURRENT USE OF INSULIN (HCC): ICD-10-CM

## 2023-08-28 DIAGNOSIS — Z00.00 ANNUAL PHYSICAL EXAM: ICD-10-CM

## 2023-08-28 LAB
ALBUMIN SERPL-MCNC: 4 G/DL (ref 3.4–5)
ALBUMIN/GLOB SERPL: 1.1 {RATIO} (ref 1–2)
ALP LIVER SERPL-CCNC: 59 U/L
ALT SERPL-CCNC: 59 U/L
ANION GAP SERPL CALC-SCNC: 7 MMOL/L (ref 0–18)
AST SERPL-CCNC: 32 U/L (ref 15–37)
BASOPHILS # BLD AUTO: 0.06 X10(3) UL (ref 0–0.2)
BASOPHILS NFR BLD AUTO: 1 %
BILIRUB SERPL-MCNC: 0.9 MG/DL (ref 0.1–2)
BUN BLD-MCNC: 23 MG/DL (ref 7–18)
CALCIUM BLD-MCNC: 9.6 MG/DL (ref 8.5–10.1)
CHLORIDE SERPL-SCNC: 101 MMOL/L (ref 98–112)
CHOLEST SERPL-MCNC: 137 MG/DL (ref ?–200)
CO2 SERPL-SCNC: 26 MMOL/L (ref 21–32)
COMPLEXED PSA SERPL-MCNC: 2.94 NG/ML (ref ?–4)
CREAT BLD-MCNC: 1.56 MG/DL
CREAT UR-SCNC: 79.8 MG/DL
EGFRCR SERPLBLD CKD-EPI 2021: 44 ML/MIN/1.73M2 (ref 60–?)
EOSINOPHIL # BLD AUTO: 0.32 X10(3) UL (ref 0–0.7)
EOSINOPHIL NFR BLD AUTO: 5.5 %
ERYTHROCYTE [DISTWIDTH] IN BLOOD BY AUTOMATED COUNT: 12.8 %
EST. AVERAGE GLUCOSE BLD GHB EST-MCNC: 217 MG/DL (ref 68–126)
FASTING PATIENT LIPID ANSWER: YES
FASTING STATUS PATIENT QL REPORTED: YES
GLOBULIN PLAS-MCNC: 3.5 G/DL (ref 2.8–4.4)
GLUCOSE BLD-MCNC: 211 MG/DL (ref 70–99)
HBA1C MFR BLD: 9.2 % (ref ?–5.7)
HCT VFR BLD AUTO: 44.5 %
HDLC SERPL-MCNC: 31 MG/DL (ref 40–59)
HGB BLD-MCNC: 15.2 G/DL
IMM GRANULOCYTES # BLD AUTO: 0.05 X10(3) UL (ref 0–1)
IMM GRANULOCYTES NFR BLD: 0.9 %
LDLC SERPL CALC-MCNC: 67 MG/DL (ref ?–100)
LYMPHOCYTES # BLD AUTO: 1.08 X10(3) UL (ref 1–4)
LYMPHOCYTES NFR BLD AUTO: 18.6 %
MCH RBC QN AUTO: 30.8 PG (ref 26–34)
MCHC RBC AUTO-ENTMCNC: 34.2 G/DL (ref 31–37)
MCV RBC AUTO: 90.1 FL
MICROALBUMIN UR-MCNC: 2.47 MG/DL
MICROALBUMIN/CREAT 24H UR-RTO: 31 UG/MG (ref ?–30)
MONOCYTES # BLD AUTO: 0.57 X10(3) UL (ref 0.1–1)
MONOCYTES NFR BLD AUTO: 9.8 %
NEUTROPHILS # BLD AUTO: 3.72 X10 (3) UL (ref 1.5–7.7)
NEUTROPHILS # BLD AUTO: 3.72 X10(3) UL (ref 1.5–7.7)
NEUTROPHILS NFR BLD AUTO: 64.2 %
NONHDLC SERPL-MCNC: 106 MG/DL (ref ?–130)
OSMOLALITY SERPL CALC.SUM OF ELEC: 288 MOSM/KG (ref 275–295)
PLATELET # BLD AUTO: 217 10(3)UL (ref 150–450)
POTASSIUM SERPL-SCNC: 4.5 MMOL/L (ref 3.5–5.1)
PROT SERPL-MCNC: 7.5 G/DL (ref 6.4–8.2)
RBC # BLD AUTO: 4.94 X10(6)UL
SODIUM SERPL-SCNC: 134 MMOL/L (ref 136–145)
T4 FREE SERPL-MCNC: 1.2 NG/DL (ref 0.8–1.7)
TRIGL SERPL-MCNC: 237 MG/DL (ref 30–149)
TSI SER-ACNC: 5.19 MIU/ML (ref 0.36–3.74)
VLDLC SERPL CALC-MCNC: 36 MG/DL (ref 0–30)
WBC # BLD AUTO: 5.8 X10(3) UL (ref 4–11)

## 2023-08-28 PROCEDURE — 85025 COMPLETE CBC W/AUTO DIFF WBC: CPT

## 2023-08-28 PROCEDURE — 83036 HEMOGLOBIN GLYCOSYLATED A1C: CPT

## 2023-08-28 PROCEDURE — 84439 ASSAY OF FREE THYROXINE: CPT

## 2023-08-28 PROCEDURE — 84443 ASSAY THYROID STIM HORMONE: CPT

## 2023-08-28 PROCEDURE — 82570 ASSAY OF URINE CREATININE: CPT

## 2023-08-28 PROCEDURE — 82043 UR ALBUMIN QUANTITATIVE: CPT

## 2023-08-28 PROCEDURE — 80061 LIPID PANEL: CPT

## 2023-08-28 PROCEDURE — 36415 COLL VENOUS BLD VENIPUNCTURE: CPT

## 2023-08-28 PROCEDURE — 80053 COMPREHEN METABOLIC PANEL: CPT

## 2023-08-28 RX ORDER — AMLODIPINE BESYLATE 5 MG/1
5 TABLET ORAL DAILY
Qty: 90 TABLET | Refills: 0 | Status: SHIPPED | OUTPATIENT
Start: 2023-08-28

## 2023-08-28 RX ORDER — METOPROLOL SUCCINATE 50 MG/1
50 TABLET, EXTENDED RELEASE ORAL DAILY
Qty: 90 TABLET | Refills: 0 | Status: SHIPPED | OUTPATIENT
Start: 2023-08-28

## 2023-08-28 RX ORDER — LEVOTHYROXINE SODIUM 0.03 MG/1
25 TABLET ORAL
Qty: 90 TABLET | Refills: 0 | Status: SHIPPED | OUTPATIENT
Start: 2023-08-28

## 2023-08-28 RX ORDER — FENOFIBRATE 145 MG/1
145 TABLET, COATED ORAL DAILY
Qty: 90 TABLET | Refills: 0 | Status: SHIPPED | OUTPATIENT
Start: 2023-08-28

## 2023-08-28 RX ORDER — INSULIN GLARGINE 100 [IU]/ML
24 INJECTION, SOLUTION SUBCUTANEOUS NIGHTLY
Qty: 30 ML | Refills: 0 | Status: SHIPPED | OUTPATIENT
Start: 2023-08-28 | End: 2023-11-26

## 2023-08-29 RX ORDER — TRIAMTERENE AND HYDROCHLOROTHIAZIDE 37.5; 25 MG/1; MG/1
1 TABLET ORAL DAILY
Qty: 90 TABLET | Refills: 0 | OUTPATIENT
Start: 2023-08-29

## 2023-08-31 RX ORDER — GLIPIZIDE 10 MG/1
10 TABLET ORAL 2 TIMES DAILY
Qty: 180 TABLET | Refills: 0 | Status: SHIPPED | OUTPATIENT
Start: 2023-08-31

## 2023-09-01 ENCOUNTER — TELEPHONE (OUTPATIENT)
Dept: FAMILY MEDICINE CLINIC | Facility: CLINIC | Age: 83
End: 2023-09-01

## 2023-09-01 DIAGNOSIS — E11.9 TYPE 2 DIABETES MELLITUS WITHOUT COMPLICATION, WITHOUT LONG-TERM CURRENT USE OF INSULIN (HCC): Primary | ICD-10-CM

## 2023-09-01 DIAGNOSIS — R79.89 ELEVATED TSH: ICD-10-CM

## 2023-09-01 DIAGNOSIS — E03.9 HYPOTHYROIDISM, UNSPECIFIED TYPE: ICD-10-CM

## 2023-09-05 VITALS
HEART RATE: 100 BPM | OXYGEN SATURATION: 98 % | WEIGHT: 231 LBS | BODY MASS INDEX: 32.34 KG/M2 | RESPIRATION RATE: 18 BRPM | SYSTOLIC BLOOD PRESSURE: 120 MMHG | DIASTOLIC BLOOD PRESSURE: 68 MMHG | TEMPERATURE: 98 F | HEIGHT: 71 IN

## 2023-09-05 PROBLEM — E11.9 TYPE 2 DIABETES MELLITUS WITHOUT COMPLICATION, WITHOUT LONG-TERM CURRENT USE OF INSULIN (HCC): Status: ACTIVE | Noted: 2023-09-05

## 2023-09-05 PROBLEM — E78.5 HYPERLIPIDEMIA: Status: ACTIVE | Noted: 2023-09-05

## 2023-09-05 PROBLEM — I10 PRIMARY HYPERTENSION: Status: ACTIVE | Noted: 2023-09-05

## 2023-09-20 RX ORDER — LEVOTHYROXINE SODIUM 0.03 MG/1
25 TABLET ORAL
Qty: 90 TABLET | Refills: 0 | Status: SHIPPED | OUTPATIENT
Start: 2023-09-20

## 2023-09-20 NOTE — TELEPHONE ENCOUNTER
Thyroid Supplements Protocol Umqirh6209/20/2023 09:06 AM   Protocol Details TSH test in past 12 months    TSH value between 0.350 and 5.500 IU/ml    Appointment in past 12 or next 3 months

## 2023-09-29 ENCOUNTER — TELEPHONE (OUTPATIENT)
Dept: FAMILY MEDICINE CLINIC | Facility: CLINIC | Age: 83
End: 2023-09-29

## 2023-09-29 NOTE — TELEPHONE ENCOUNTER
Overdue labs  Letter sent  Future Appointments   Date Time Provider Celsa Schreiber   11/21/2023  8:20 AM Lucía Monday, MD [facility] Devradha [facility]ing

## 2023-10-03 DIAGNOSIS — E11.9 TYPE 2 DIABETES MELLITUS WITHOUT COMPLICATION, WITHOUT LONG-TERM CURRENT USE OF INSULIN (HCC): ICD-10-CM

## 2023-10-03 RX ORDER — LISINOPRIL 2.5 MG/1
TABLET ORAL
Qty: 90 TABLET | Refills: 0 | Status: SHIPPED | OUTPATIENT
Start: 2023-10-03

## 2023-11-25 RX ORDER — FENOFIBRATE 145 MG/1
145 TABLET, COATED ORAL DAILY
Qty: 90 TABLET | Refills: 0 | Status: SHIPPED | OUTPATIENT
Start: 2023-11-25

## 2023-11-25 NOTE — TELEPHONE ENCOUNTER
Cholesterol Medication Protocol Yaupdu3911/25/2023 11:33 AM   Protocol Details ALT < 80    ALT resulted within past year    Lipid panel within past 12 months    Appointment within past 12 or next 3 months

## 2023-11-25 NOTE — TELEPHONE ENCOUNTER
LOV: 8/24/23 for physical    FENOFIBRATE 145 MG Oral Tab  TAKE 1 TABLET BY MOUTH EVERY DAY Dispense: 90 tablet, Refills: 0 ordered       08/28/2023     No future appointments.

## 2023-11-28 RX ORDER — TRIAMTERENE AND HYDROCHLOROTHIAZIDE 37.5; 25 MG/1; MG/1
1 TABLET ORAL DAILY
Qty: 90 TABLET | Refills: 0 | Status: SHIPPED | OUTPATIENT
Start: 2023-11-28

## 2023-11-28 NOTE — TELEPHONE ENCOUNTER
Hypertension Medications Protocol Xnuwxm25/2      Refill sent  Left detailed message. Ok per HIPAA form. Advised to call with questions.

## 2023-11-28 NOTE — TELEPHONE ENCOUNTER
Rx refill requested     Per pt he is in [place] right now.  Per pt he is out of meds     TRIAMTERENE-HCTZ 37.5-25 MG Oral Tab     [id number] [address] [facility], [phone], [phone]

## 2023-12-08 RX ORDER — AMLODIPINE BESYLATE 5 MG/1
5 TABLET ORAL DAILY
Qty: 90 TABLET | Refills: 0 | Status: SHIPPED | OUTPATIENT
Start: 2023-12-08

## 2023-12-08 NOTE — TELEPHONE ENCOUNTER
Last OV 8/24/23  Last refilled on 8/28/23 for # 90 with 0 refills  No future appointments. Thank you.

## 2024-01-15 DIAGNOSIS — E11.9 TYPE 2 DIABETES MELLITUS WITHOUT COMPLICATION, WITHOUT LONG-TERM CURRENT USE OF INSULIN (HCC): ICD-10-CM

## 2024-01-15 RX ORDER — LISINOPRIL 2.5 MG/1
2.5 TABLET ORAL DAILY
Qty: 90 TABLET | Refills: 0 | Status: SHIPPED | OUTPATIENT
Start: 2024-01-15 | End: 2024-01-17

## 2024-01-15 RX ORDER — METOPROLOL SUCCINATE 50 MG/1
50 TABLET, EXTENDED RELEASE ORAL DAILY
Qty: 90 TABLET | Refills: 0 | Status: SHIPPED | OUTPATIENT
Start: 2024-01-15

## 2024-01-15 RX ORDER — AMLODIPINE BESYLATE 5 MG/1
5 TABLET ORAL DAILY
Qty: 90 TABLET | Refills: 0 | Status: SHIPPED | OUTPATIENT
Start: 2024-01-15

## 2024-01-15 NOTE — TELEPHONE ENCOUNTER
insulin glargine (BASAGLAR KWIKPEN) 100 UNIT/ML Subcutaneous Solution Pen-injector     LISINOPRIL 2.5 MG Oral Tab   Send to     [facility]/pharmacy #[id number] - [place], [place] - [address] [facility] AT [facility], [phone], [phone] [address] [facility] [address]

## 2024-01-15 NOTE — TELEPHONE ENCOUNTER
LOV 23 for a px.      Hypertension Medications Protocol Icvhoj28/15/2024 10:30 AM   Protocol Details CMP or BMP in past 12 months    Last serum creatinine< 2.0    Appointment in past 6 or next 3 months        Lisinopril sent.    Medication Quantity Refills Start End   insulin glargine (BASAGLAR KWIKPEN) 100 UNIT/ML Subcutaneous Solution Pen-injector () 30 mL 0 2023   Sig:   Inject 24 Units into the skin nightly.     Route:   Subcutaneous       No future appointments.

## 2024-01-15 NOTE — TELEPHONE ENCOUNTER
Hypertension Medications Protocol Lqbfoc92/15/2024 10:17 AM   Protocol Details CMP or BMP in past 12 months    Last serum creatinine< 2.0    Appointment in past 6 or next 3 months

## 2024-01-17 RX ORDER — LISINOPRIL 2.5 MG/1
2.5 TABLET ORAL DAILY
Qty: 90 TABLET | Refills: 0 | Status: SHIPPED | OUTPATIENT
Start: 2024-01-17

## 2024-01-17 RX ORDER — INSULIN GLARGINE 100 [IU]/ML
24 INJECTION, SOLUTION SUBCUTANEOUS NIGHTLY
Qty: 30 ML | Refills: 0 | Status: SHIPPED | OUTPATIENT
Start: 2024-01-17 | End: 2024-01-18

## 2024-01-17 NOTE — TELEPHONE ENCOUNTER
Pt's wife called and is wondering status of     LISINOPRIL 2.5 MG Oral Tab      [facility]     [id number] [place], [place] - [address] [facility], [phone], [phone] [address] [facility] [address]

## 2024-01-18 ENCOUNTER — TELEPHONE (OUTPATIENT)
Dept: FAMILY MEDICINE CLINIC | Facility: CLINIC | Age: 84
End: 2024-01-18

## 2024-01-18 DIAGNOSIS — E11.9 TYPE 2 DIABETES MELLITUS WITHOUT COMPLICATION, WITHOUT LONG-TERM CURRENT USE OF INSULIN (HCC): ICD-10-CM

## 2024-01-18 RX ORDER — INSULIN GLARGINE 100 [IU]/ML
24 INJECTION, SOLUTION SUBCUTANEOUS NIGHTLY
Qty: 30 ML | Refills: 0 | Status: SHIPPED | OUTPATIENT
Start: 2024-01-18 | End: 2024-04-17

## 2024-01-18 NOTE — TELEPHONE ENCOUNTER
nsulin glargine (BASAGLAR KWIKPEN) 100 UNIT/ML Subcutaneous Solution Pen-injector     This Rx was sent to [facility] [place] in [place],  This needs to be sent to     [facility]/pharmacy #[id number] - [place], [place] - [address] [facility] AT [facility], [phone], [phone]     Can this be sent today

## 2024-01-23 ENCOUNTER — TELEPHONE (OUTPATIENT)
Dept: FAMILY MEDICINE CLINIC | Facility: CLINIC | Age: 84
End: 2024-01-23

## 2024-02-08 DIAGNOSIS — E11.9 TYPE 2 DIABETES MELLITUS WITHOUT COMPLICATION, WITHOUT LONG-TERM CURRENT USE OF INSULIN (HCC): ICD-10-CM

## 2024-02-08 RX ORDER — PEN NEEDLE, DIABETIC 29 G X1/2"
NEEDLE, DISPOSABLE MISCELLANEOUS
Qty: 100 EACH | Refills: 1 | Status: SHIPPED | OUTPATIENT
Start: 2024-02-08

## 2024-02-08 RX ORDER — LISINOPRIL 2.5 MG/1
2.5 TABLET ORAL DAILY
Qty: 90 TABLET | Refills: 0 | OUTPATIENT
Start: 2024-02-08

## 2024-02-08 NOTE — TELEPHONE ENCOUNTER
Pt also requested a refill on Insulin Pen Needle (BD PEN NEEDLE ORIGINAL U/F) 29G X 12.7MM Does not apply [facility]     [id number] [place] - [address] [facility] [address] [facility], [phone], [phone]

## 2024-02-08 NOTE — TELEPHONE ENCOUNTER
Rx already sent. 1/17/24 for 90 day supply    lisinopril 2.5 MG Oral Tab  Take 1 tablet (2.5 mg total) by mouth daily. Dispense: 90 tablet, Refills: 0 ordered       01/17/2024

## 2024-02-08 NOTE — TELEPHONE ENCOUNTER
Diabetic Supplies Protocol Rivmso0602/08/2024 03:08 PM   Protocol Details In person appointment or virtual visit in the past 12 mos or appointment in next 3 mos

## 2024-02-22 RX ORDER — TRIAMTERENE AND HYDROCHLOROTHIAZIDE 37.5; 25 MG/1; MG/1
1 TABLET ORAL DAILY
Qty: 90 TABLET | Refills: 0 | Status: SHIPPED | OUTPATIENT
Start: 2024-02-22

## 2024-02-22 RX ORDER — FENOFIBRATE 145 MG/1
145 TABLET, COATED ORAL DAILY
Qty: 90 TABLET | Refills: 0 | Status: SHIPPED | OUTPATIENT
Start: 2024-02-22

## 2024-02-22 RX ORDER — GLIPIZIDE 10 MG/1
10 TABLET ORAL 2 TIMES DAILY
Qty: 180 TABLET | Refills: 0 | Status: SHIPPED | OUTPATIENT
Start: 2024-02-22

## 2024-02-22 RX ORDER — LEVOTHYROXINE SODIUM 0.03 MG/1
25 TABLET ORAL
Qty: 90 TABLET | Refills: 0 | Status: SHIPPED | OUTPATIENT
Start: 2024-02-22

## 2024-02-22 NOTE — TELEPHONE ENCOUNTER
Fenofibrate last refilled 11/25/23  Glipizide last refilled 8/31/23  Levothyroxine last refilled 9/20/23  Triamterene last refilled 11/28/23  NFA  LOV with  8/24/23 for px  Routed to advise    Hypertension Medications Protocol Lmeowb8502/22/2024 10:08 AM   Protocol Details EGFRCR or GFRNAA > 50    CMP or BMP in past 12 months    Last BP reading less than 140/90    In person appointment or virtual visit in the past 12 mos or appointment in next 3 mos       Thyroid Medication Protocol Hwpjgs0602/22/2024 10:08 AM   Protocol Details Last TSH value is normal    TSH in past 12 months    In person appointment or virtual visit in the past 12 mos or appointment in next 3 mos        Diabetes Medication Protocol Buaqec7702/22/2024 10:08 AM   Protocol Details Last A1C < 7.5 and within past 6 months    In person appointment or virtual visit in the past 6 mos or appointment in next 3 mos    EGFRCR or GFRNAA > 50    Microalbumin procedure in past 12 months or taking ACE/ARB    GFR in the past 12 months     Cholesterol Medication Protocol Ivgudb8402/22/2024 10:08 AM   Protocol Details ALT < 80    ALT resulted within past year    Lipid panel within past 12 months    In person appointment or virtual visit in the past 12 mos or appointment in next 3 mos

## 2024-03-16 DIAGNOSIS — E11.9 TYPE 2 DIABETES MELLITUS WITHOUT COMPLICATION, WITHOUT LONG-TERM CURRENT USE OF INSULIN (HCC): ICD-10-CM

## 2024-03-16 RX ORDER — INSULIN GLARGINE 100 [IU]/ML
24 INJECTION, SOLUTION SUBCUTANEOUS NIGHTLY
Qty: 30 ML | Refills: 0 | Status: SHIPPED | OUTPATIENT
Start: 2024-03-16 | End: 2024-06-14

## 2024-03-16 RX ORDER — INSULIN GLARGINE 100 [IU]/ML
24 INJECTION, SOLUTION SUBCUTANEOUS NIGHTLY
Qty: 30 ML | Refills: 0 | Status: SHIPPED | OUTPATIENT
Start: 2024-03-16 | End: 2024-03-16

## 2024-03-16 NOTE — TELEPHONE ENCOUNTER
SPoke to patient and he will give us a call back once he talks to his wife about appts and blood test.

## 2024-03-18 NOTE — TELEPHONE ENCOUNTER
Diabetes Medication Protocol Hrevqi1203/18/2024 10:21 AM   Protocol Details Last A1C < 7.5 and within past 6 months    EGFRCR or GFRNAA > 50    In person appointment or virtual visit in the past 6 mos or appointment in next 3 mos    Microalbumin procedure in past 12 months or taking ACE/ARB    GFR in the past 12 months        Last OV 8/24/23  Last refilled on 5/25/23 for # 180 with 1 refills  Future Appointments   Date Time Provider Department Center   4/23/2024  1:40 PM Alisha Weiss MD [facility]        Thank you.

## 2024-04-23 ENCOUNTER — OFFICE VISIT (OUTPATIENT)
Dept: FAMILY MEDICINE CLINIC | Facility: CLINIC | Age: 84
End: 2024-04-23
Payer: MEDICARE

## 2024-04-23 VITALS
DIASTOLIC BLOOD PRESSURE: 60 MMHG | WEIGHT: 234 LBS | TEMPERATURE: 97 F | OXYGEN SATURATION: 96 % | SYSTOLIC BLOOD PRESSURE: 118 MMHG | HEIGHT: 71 IN | RESPIRATION RATE: 18 BRPM | HEART RATE: 59 BPM | BODY MASS INDEX: 32.76 KG/M2

## 2024-04-23 DIAGNOSIS — R79.89 ELEVATED TSH: ICD-10-CM

## 2024-04-23 DIAGNOSIS — E78.49 OTHER HYPERLIPIDEMIA: ICD-10-CM

## 2024-04-23 DIAGNOSIS — E03.9 HYPOTHYROIDISM, UNSPECIFIED TYPE: ICD-10-CM

## 2024-04-23 DIAGNOSIS — E11.9 TYPE 2 DIABETES MELLITUS WITHOUT COMPLICATION, WITHOUT LONG-TERM CURRENT USE OF INSULIN (HCC): Primary | ICD-10-CM

## 2024-04-23 PROCEDURE — 96127 BRIEF EMOTIONAL/BEHAV ASSMT: CPT | Performed by: FAMILY MEDICINE

## 2024-04-23 PROCEDURE — 99214 OFFICE O/P EST MOD 30 MIN: CPT | Performed by: FAMILY MEDICINE

## 2024-04-23 NOTE — PROGRESS NOTES
Chief Complaint   Patient presents with    Follow - Up     6 month follow up         HPI:    Patient ID: Shravan Castellano is a 83 year old male.    HPI   Doing well  Dm- fbs is 180 to 220. Insulin 24 unit at night with metformin and glipizide.  Recommend to increase to 26. Recommend to get fasting labs.   He refuse vaccine.    Review of Systems  Neg tingling numbness chest pain sob.       Current Outpatient Medications   Medication Sig Dispense Refill    METFORMIN HCL 1000 MG Oral Tab TAKE 1 TABLET BY MOUTH IN THE MORNING AND BEFORE BEDTIME 180 tablet 1    insulin glargine (BASAGLAR KWIKPEN) 100 UNIT/ML Subcutaneous Solution Pen-injector Inject 24 Units into the skin nightly. 30 mL 0    TRIAMTERENE-HCTZ 37.5-25 MG Oral Tab TAKE 1 TABLET BY MOUTH EVERY DAY 90 tablet 0    LEVOTHYROXINE 25 MCG Oral Tab TAKE 1 TABLET BY MOUTH EVERY DAY BEFORE BREAKFAST 90 tablet 0    GLIPIZIDE 10 MG Oral Tab TAKE 1 TABLET (10 MG TOTAL) BY MOUTH IN THE MORNING AND BEFORE BEDTIME 180 tablet 0    FENOFIBRATE 145 MG Oral Tab TAKE 1 TABLET BY MOUTH EVERY DAY 90 tablet 0    METOPROLOL SUCCINATE ER 50 MG Oral Tablet 24 Hr TAKE 1 TABLET BY MOUTH EVERY DAY 90 tablet 0    AMLODIPINE 5 MG Oral Tab TAKE 1 TABLET (5 MG TOTAL) BY MOUTH DAILY. 90 tablet 0    atorvastatin 20 MG Oral Tab TAKE 1 TABLET BY MOUTH EVERYDAY AT BEDTIME 90 tablet 3    isosorbide mononitrate ER 30 MG Oral Tablet 24 Hr Take 1 tablet (30 mg total) by mouth daily.      ferrous sulfate 325 (65 FE) MG Oral Tab EC Take 1 tablet (325 mg total) by mouth daily with breakfast.      Omega-3 Fatty Acids (FISH OIL) 1200 MG Oral Cap Take by mouth.      Multiple Vitamin (MULTI-DAY) Oral Tab Take by mouth.      Insulin Pen Needle (BD PEN NEEDLE ORIGINAL U/F) 29G X 12.7MM Does not apply Misc Inject 10 Units into the skin nightly. - Subcutaneous 100 each 1    lisinopril 2.5 MG Oral Tab Take 1 tablet (2.5 mg total) by mouth daily. 90 tablet 0    Empagliflozin (JARDIANCE) 25 MG Oral Tab Take 1  Detail Level: Detailed tablet by mouth daily. 90 tablet 0     Allergies:No Known Allergies    HISTORY:  No past medical history on file.   No past surgical history on file.   No family history on file.   Social History:   Social History     Socioeconomic History    Marital status:    Tobacco Use    Smoking status: Never    Smokeless tobacco: Never        PHYSICAL EXAM:    /60   Pulse 59   Temp 97.4 °F (36.3 °C)   Resp 18   Ht 5' 11\" (1.803 m)   Wt 234 lb (106.1 kg)   SpO2 96%   BMI 32.64 kg/m²    Physical Exam  Constitutional:       General: He is not in acute distress.     Appearance: He is not ill-appearing.   Cardiovascular:      Rate and Rhythm: Normal rate and regular rhythm.      Pulses: Normal pulses.      Heart sounds: Normal heart sounds.   Pulmonary:      Effort: Pulmonary effort is normal.      Breath sounds: Normal breath sounds.   Neurological:      Mental Status: He is alert.     Bilateral barefoot skin diabetic exam is normal, visualized feet and the appearance is normal.  Bilateral monofilament/sensation of both feet is normal.  Pulsation pedal pulse exam of both lower legs/feet is normal as well.            ASSESSMENT/PLAN:   1. Type 2 diabetes mellitus without complication, without long-term current use of insulin (HCC)  Labs ordered  Continue meds    2. Other hyperlipidemia  Labs ordered    3. Elevated TSH  Labs ordered  - TSH W Reflex To Free T4; Future    4. Hypothyroidism, unspecified type  As above  - TSH W Reflex To Free T4; Future             No follow-ups on file.

## 2024-04-24 ENCOUNTER — LAB ENCOUNTER (OUTPATIENT)
Dept: LAB | Age: 84
End: 2024-04-24
Attending: FAMILY MEDICINE
Payer: MEDICARE

## 2024-04-24 DIAGNOSIS — E11.9 TYPE 2 DIABETES MELLITUS WITHOUT COMPLICATION, WITHOUT LONG-TERM CURRENT USE OF INSULIN (HCC): ICD-10-CM

## 2024-04-24 DIAGNOSIS — R79.89 ELEVATED TSH: ICD-10-CM

## 2024-04-24 DIAGNOSIS — E03.9 HYPOTHYROIDISM, UNSPECIFIED TYPE: ICD-10-CM

## 2024-04-24 LAB
ALBUMIN SERPL-MCNC: 3.8 G/DL (ref 3.4–5)
ALBUMIN/GLOB SERPL: 1.2 {RATIO} (ref 1–2)
ALP LIVER SERPL-CCNC: 65 U/L
ALT SERPL-CCNC: 42 U/L
ANION GAP SERPL CALC-SCNC: 8 MMOL/L (ref 0–18)
AST SERPL-CCNC: 29 U/L (ref 15–37)
BILIRUB SERPL-MCNC: 0.9 MG/DL (ref 0.1–2)
BUN BLD-MCNC: 27 MG/DL (ref 9–23)
CALCIUM BLD-MCNC: 9.4 MG/DL (ref 8.5–10.1)
CHLORIDE SERPL-SCNC: 102 MMOL/L (ref 98–112)
CHOLEST SERPL-MCNC: 132 MG/DL (ref ?–200)
CO2 SERPL-SCNC: 25 MMOL/L (ref 21–32)
CREAT BLD-MCNC: 1.52 MG/DL
EGFRCR SERPLBLD CKD-EPI 2021: 45 ML/MIN/1.73M2 (ref 60–?)
EST. AVERAGE GLUCOSE BLD GHB EST-MCNC: 252 MG/DL (ref 68–126)
FASTING PATIENT LIPID ANSWER: YES
FASTING STATUS PATIENT QL REPORTED: YES
GLOBULIN PLAS-MCNC: 3.3 G/DL (ref 2.8–4.4)
GLUCOSE BLD-MCNC: 247 MG/DL (ref 70–99)
HBA1C MFR BLD: 10.4 % (ref ?–5.7)
HDLC SERPL-MCNC: 36 MG/DL (ref 40–59)
LDLC SERPL CALC-MCNC: 63 MG/DL (ref ?–100)
NONHDLC SERPL-MCNC: 96 MG/DL (ref ?–130)
OSMOLALITY SERPL CALC.SUM OF ELEC: 293 MOSM/KG (ref 275–295)
POTASSIUM SERPL-SCNC: 4.5 MMOL/L (ref 3.5–5.1)
PROT SERPL-MCNC: 7.1 G/DL (ref 6.4–8.2)
SODIUM SERPL-SCNC: 135 MMOL/L (ref 136–145)
T4 FREE SERPL-MCNC: 1.1 NG/DL (ref 0.8–1.7)
TRIGL SERPL-MCNC: 196 MG/DL (ref 30–149)
TSI SER-ACNC: 4.9 MIU/ML (ref 0.36–3.74)
VLDLC SERPL CALC-MCNC: 29 MG/DL (ref 0–30)

## 2024-04-24 PROCEDURE — 83036 HEMOGLOBIN GLYCOSYLATED A1C: CPT

## 2024-04-24 PROCEDURE — 36415 COLL VENOUS BLD VENIPUNCTURE: CPT

## 2024-04-24 PROCEDURE — 84439 ASSAY OF FREE THYROXINE: CPT

## 2024-04-24 PROCEDURE — 84443 ASSAY THYROID STIM HORMONE: CPT

## 2024-04-24 PROCEDURE — 80053 COMPREHEN METABOLIC PANEL: CPT

## 2024-04-24 PROCEDURE — 80061 LIPID PANEL: CPT

## 2024-04-30 ENCOUNTER — TELEPHONE (OUTPATIENT)
Dept: FAMILY MEDICINE CLINIC | Facility: CLINIC | Age: 84
End: 2024-04-30

## 2024-04-30 DIAGNOSIS — E11.9 TYPE 2 DIABETES MELLITUS WITHOUT COMPLICATION, WITHOUT LONG-TERM CURRENT USE OF INSULIN (HCC): Primary | ICD-10-CM

## 2024-04-30 NOTE — TELEPHONE ENCOUNTER
His hba1c is still elevated 10.4 highly recommend seeing diabetic apn. Please give him Rosamaria Felix number. I highly recommend he has to see them since his numbers keep on increasing. Meanwhile increase his insulin at night time from 26 to 28 units and check bs 3 times a day.  Tsh mild elevated t4 normal if otherwise doing well I recommend to continue same dose.   Cholesterol normal. Kidney and liver function stable.

## 2024-05-01 NOTE — TELEPHONE ENCOUNTER
Patient advised. Verbalized understanding.   Patient refusing to see diabetic educator, states he is going to eat what he wants and isn't going to change

## 2024-05-07 NOTE — TELEPHONE ENCOUNTER
Scheduled   Future Appointments   Date Time Provider Department Center   5/14/2024  4:00 PM Alisha Weiss MD [facility]   10/14/2024 11:40 AM Alisha Weiss MD [facility]

## 2024-05-07 NOTE — TELEPHONE ENCOUNTER
Schedule him with me to discuss result of his labs and dm. Ov needed 40mins. You can use resp spot next wk.

## 2024-05-14 ENCOUNTER — OFFICE VISIT (OUTPATIENT)
Dept: FAMILY MEDICINE CLINIC | Facility: CLINIC | Age: 84
End: 2024-05-14

## 2024-05-14 VITALS
OXYGEN SATURATION: 98 % | TEMPERATURE: 97 F | RESPIRATION RATE: 16 BRPM | HEART RATE: 76 BPM | SYSTOLIC BLOOD PRESSURE: 160 MMHG | DIASTOLIC BLOOD PRESSURE: 78 MMHG | WEIGHT: 232 LBS | BODY MASS INDEX: 32.48 KG/M2 | HEIGHT: 71 IN

## 2024-05-14 DIAGNOSIS — E11.9 TYPE 2 DIABETES MELLITUS WITHOUT COMPLICATION, WITHOUT LONG-TERM CURRENT USE OF INSULIN (HCC): Primary | ICD-10-CM

## 2024-05-14 PROCEDURE — 99214 OFFICE O/P EST MOD 30 MIN: CPT | Performed by: FAMILY MEDICINE

## 2024-05-14 NOTE — PROGRESS NOTES
Chief Complaint   Patient presents with    Follow - Up     Follow up on labs     DM follow up      HPI:    Patient ID: Shravan Castellano is a 83 year old male.    HPI DM- sine many yrs not well controlled. Patient is aware and wife is aware it is not well controlled.  Home reading 180 to 200 and he only checks fbs.  home reading 180   Long acting insulin night time 28 units. Fasting blood sugar was 182.    Recommend to check after lunch and dinner.  Discuss other meds he refuse he has seen educator in past and refuse. He decline follow any dietary advice.   Recommend to take insulin 30 units.   He is seeing cardiologist Dr. Ashley Reich.  He refuse all vaccine.   Seen derm in [place]. Discuss if he wants to see derm here we can refer him. He did have skin cancer in past.   Also seen derm in [place] in past.  We will get records from his cardiologist.          Review of Systems  Neg fever chest pain sob      Current Outpatient Medications   Medication Sig Dispense Refill    METFORMIN HCL 1000 MG Oral Tab TAKE 1 TABLET BY MOUTH IN THE MORNING AND BEFORE BEDTIME 180 tablet 1    insulin glargine (BASAGLAR KWIKPEN) 100 UNIT/ML Subcutaneous Solution Pen-injector Inject 24 Units into the skin nightly. (Patient taking differently: Inject 28 Units into the skin nightly.) 30 mL 0    METOPROLOL SUCCINATE ER 50 MG Oral Tablet 24 Hr TAKE 1 TABLET BY MOUTH EVERY DAY 90 tablet 0    Empagliflozin (JARDIANCE) 25 MG Oral Tab Take 1 tablet by mouth daily. 90 tablet 0    isosorbide mononitrate ER 30 MG Oral Tablet 24 Hr Take 1 tablet (30 mg total) by mouth daily.      ferrous sulfate 325 (65 FE) MG Oral Tab EC Take 1 tablet (325 mg total) by mouth daily with breakfast.      Omega-3 Fatty Acids (FISH OIL) 1200 MG Oral Cap Take by mouth.      Multiple Vitamin (MULTI-DAY) Oral Tab Take by mouth.      FENOFIBRATE 145 MG Oral Tab TAKE 1 TABLET BY MOUTH EVERY DAY 90 tablet 0    LEVOTHYROXINE 25 MCG Oral Tab TAKE 1 TABLET BY MOUTH EVERY  DAY BEFORE BREAKFAST 90 tablet 0    TRIAMTERENE-HCTZ 37.5-25 MG Oral Tab TAKE 1 TABLET BY MOUTH EVERY DAY 90 tablet 0    GLIPIZIDE 10 MG Oral Tab TAKE 1 TABLET (10 MG TOTAL) BY MOUTH IN THE MORNING AND BEFORE BEDTIME 180 tablet 0    AMLODIPINE 5 MG Oral Tab TAKE 1 TABLET (5 MG TOTAL) BY MOUTH DAILY. 90 tablet 0    LISINOPRIL 2.5 MG Oral Tab TAKE 1 TABLET BY MOUTH EVERY DAY 90 tablet 0    atorvastatin 20 MG Oral Tab TAKE 1 TABLET BY MOUTH EVERYDAY AT BEDTIME 90 tablet 0    Insulin Pen Needle (BD PEN NEEDLE ORIGINAL U/F) 29G X 12.7MM Does not apply Misc Inject 10 Units into the skin nightly. - Subcutaneous 100 each 1     Allergies:No Known Allergies    HISTORY:  No past medical history on file.   No past surgical history on file.   No family history on file.   Social History:   Social History     Socioeconomic History    Marital status:    Tobacco Use    Smoking status: Never    Smokeless tobacco: Never        PHYSICAL EXAM:    /78   Pulse 76   Temp 96.8 °F (36 °C)   Resp 16   Ht 5' 11\" (1.803 m)   Wt 232 lb (105.2 kg)   SpO2 98%   BMI 32.36 kg/m²    Physical Exam  Constitutional:       General: He is not in acute distress.     Appearance: He is not ill-appearing.   Cardiovascular:      Rate and Rhythm: Normal rate and regular rhythm.      Pulses: Normal pulses.      Heart sounds: Normal heart sounds.   Pulmonary:      Breath sounds: Normal breath sounds.   Skin:     General: Skin is warm.      Capillary Refill: Capillary refill takes less than 2 seconds.   Neurological:      Mental Status: He is alert.              ASSESSMENT/PLAN:   1. Type 2 diabetes mellitus without complication, without long-term current use of insulin ([facility])  Discuss with him hba1c result and other result  Aware about not well controlled dm and complication.  Offered diet and exercise and also seen diabetic counsellor but he decline.  Offered other meds including humalog and novalog he decline.  Did agree he will try to check  his bs after lunch and dinner.  He will send me log of bs.  Increase dose of insulin to 30 units.  If any concern call office. He vu              No follow-ups on file.

## 2024-05-15 DIAGNOSIS — E11.9 TYPE 2 DIABETES MELLITUS WITHOUT COMPLICATION, WITHOUT LONG-TERM CURRENT USE OF INSULIN (HCC): ICD-10-CM

## 2024-05-15 RX ORDER — TRIAMTERENE AND HYDROCHLOROTHIAZIDE 37.5; 25 MG/1; MG/1
1 TABLET ORAL DAILY
Qty: 90 TABLET | Refills: 0 | Status: SHIPPED | OUTPATIENT
Start: 2024-05-15

## 2024-05-15 RX ORDER — AMLODIPINE BESYLATE 5 MG/1
5 TABLET ORAL DAILY
Qty: 90 TABLET | Refills: 0 | Status: SHIPPED | OUTPATIENT
Start: 2024-05-15

## 2024-05-15 RX ORDER — ATORVASTATIN CALCIUM 20 MG/1
20 TABLET, FILM COATED ORAL NIGHTLY
Qty: 90 TABLET | Refills: 0 | Status: SHIPPED | OUTPATIENT
Start: 2024-05-15

## 2024-05-15 RX ORDER — FENOFIBRATE 145 MG/1
145 TABLET, COATED ORAL DAILY
Qty: 90 TABLET | Refills: 0 | Status: SHIPPED | OUTPATIENT
Start: 2024-05-15

## 2024-05-15 RX ORDER — LISINOPRIL 2.5 MG/1
2.5 TABLET ORAL DAILY
Qty: 90 TABLET | Refills: 0 | Status: SHIPPED | OUTPATIENT
Start: 2024-05-15

## 2024-05-15 NOTE — TELEPHONE ENCOUNTER
Hypertension Medications Protocol Pnqjty01/15/2024 08:06 AM   Protocol Details Last BP reading less than 140/90    EGFRCR or GFRNAA > 50    CMP or BMP in past 12 months    In person appointment or virtual visit in the past 12 m        BP Readings from Last 3 Encounters:   05/14/24 160/78   04/23/24 118/60   08/24/23 120/68     Last refill 2/22/24 90 0 refill  Last OV 5/14/24  Future Appointments   Date Time Provider Department Center   10/14/2024 11:20 AM Shayna Zelaya, APRN [facility] [place]

## 2024-05-16 NOTE — TELEPHONE ENCOUNTER
Scheduled   Future Appointments   Date Time Provider Department Center   5/30/2024  2:00 PM [facility] NURSE [facility] [place]   10/14/2024 11:20 AM Shayna Zelaya APRN [facility] [place]

## 2024-05-23 RX ORDER — FENOFIBRATE 145 MG/1
145 TABLET, COATED ORAL DAILY
Qty: 90 TABLET | Refills: 0 | Status: SHIPPED | OUTPATIENT
Start: 2024-05-23

## 2024-05-23 RX ORDER — LEVOTHYROXINE SODIUM 0.03 MG/1
25 TABLET ORAL
Qty: 90 TABLET | Refills: 0 | Status: SHIPPED | OUTPATIENT
Start: 2024-05-23

## 2024-05-23 RX ORDER — TRIAMTERENE AND HYDROCHLOROTHIAZIDE 37.5; 25 MG/1; MG/1
1 TABLET ORAL DAILY
Qty: 90 TABLET | Refills: 0 | Status: SHIPPED | OUTPATIENT
Start: 2024-05-23

## 2024-05-23 RX ORDER — GLIPIZIDE 10 MG/1
10 TABLET ORAL 2 TIMES DAILY
Qty: 180 TABLET | Refills: 0 | Status: SHIPPED | OUTPATIENT
Start: 2024-05-23

## 2024-05-23 NOTE — TELEPHONE ENCOUNTER
LOV: 5/14/23 follow up on labs    FENOFIBRATE 145 MG Oral Tab  TAKE 1 TABLET BY MOUTH EVERY DAY Dispense: 90 tablet, Refills: 0 ordered        05/15/2024       LEVOTHYROXINE 25 MCG Oral Tab  TAKE 1 TABLET BY MOUTH EVERY DAY BEFORE BREAKFAST Dispense: 90 tablet, Refills: 0 ordered        02/22/2024       TRIAMTERENE-HCTZ 37.5-25 MG Oral Tab  TAKE 1 TABLET BY MOUTH EVERY DAY Dispense: 90 tablet, Refills: 0 ordered        05/15/2024     GLIPIZIDE 10 MG Oral Tab  TAKE 1 TABLET (10 MG TOTAL) BY MOUTH IN THE MORNING AND BEFORE BEDTIME Dispense: 180 tablet, Refills: 0 ordered        02/22/2024     Cholesterol Medication Protocol Dwvauj8305/22/2024 11:08 PM   Protocol Details ALT < 80    ALT resulted within past year    Lipid panel within past 12 months    In person appointment or virtual visit in the past 12 mos or appointment in next 3 mos

## 2024-06-10 RX ORDER — METOPROLOL SUCCINATE 50 MG/1
50 TABLET, EXTENDED RELEASE ORAL DAILY
Qty: 90 TABLET | Refills: 0 | Status: CANCELLED | OUTPATIENT
Start: 2024-06-10

## 2024-06-10 NOTE — TELEPHONE ENCOUNTER
Last OV 5/14/24  Last refilled on 1/15/24 for # 90 with 0 refills  Future Appointments   Date Time Provider Department Center   10/14/2024 11:20 AM Shayna Zelaya APRN [facility] [place]          Hypertension Medications Protocol Poxijr80/10/2024 12:58 PM   Protocol Details Last BP reading less than 140/90    EGFRCR or GFRNAA > 50    CMP or BMP in past 12 months    In person appointment or virtual visit in the         BP Readings from Last 3 Encounters:   05/14/24 160/78   04/23/24 118/60   08/24/23 120/68

## 2024-06-10 NOTE — TELEPHONE ENCOUNTER
Patient requesting refill for     METOPROLOL SUCCINATE ER 50 MG Oral Tablet 24 Hr     [id number] - [place], [place] - [address]

## 2024-06-11 NOTE — TELEPHONE ENCOUNTER
Patient called and states he is completely out of theses medications and is wondering if they can be filled today. Please advise

## 2024-06-12 RX ORDER — METOPROLOL SUCCINATE 50 MG/1
50 TABLET, EXTENDED RELEASE ORAL DAILY
Qty: 90 TABLET | Refills: 3 | Status: SHIPPED | OUTPATIENT
Start: 2024-06-12

## 2024-07-01 DIAGNOSIS — E11.9 TYPE 2 DIABETES MELLITUS WITHOUT COMPLICATION, WITHOUT LONG-TERM CURRENT USE OF INSULIN (HCC): ICD-10-CM

## 2024-07-01 NOTE — TELEPHONE ENCOUNTER
See below  Refill pended for 30 units nightly  Last refill 3/16/24  Last office visit 5/14/24  Future Appointments   Date Time Provider Department Center   10/14/2024 11:20 AM Shayna Zelaya APRN [facility] [place]

## 2024-07-01 NOTE — TELEPHONE ENCOUNTER
Patient called and states he needs his insulin refilled. Per patient, the [facility] in [place] called him and told him they can't refill it because it is not updated. Per patient, they have it for 24 units and patient states he takes 30 units. Please advise     [facility]/pharmacy #[id number] - [place], [place] - [address]

## 2024-07-02 NOTE — TELEPHONE ENCOUNTER
Patient states he has 3 days left of his insulin    Send to: [facility] [place]  [phone]  Fax [phone]

## 2024-07-02 NOTE — TELEPHONE ENCOUNTER
Routed to advise refill    Diabetes Medication Protocol Ladbqv6607/02/2024 11:20 AM   Protocol Details Last A1C < 7.5 and within past 6 months    EGFRCR or GFRNAA > 50    In person appointment or virtual visit in the past 6 mos or appointment in next 3 mos    Microalbumin procedure in past 12 months or taking ACE/ARB    GFR in the past 12 months

## 2024-07-03 DIAGNOSIS — E11.9 TYPE 2 DIABETES MELLITUS WITHOUT COMPLICATION, WITHOUT LONG-TERM CURRENT USE OF INSULIN (HCC): ICD-10-CM

## 2024-07-03 RX ORDER — INSULIN GLARGINE 100 [IU]/ML
30 INJECTION, SOLUTION SUBCUTANEOUS NIGHTLY
Qty: 30 ML | Refills: 0 | Status: SHIPPED | OUTPATIENT
Start: 2024-07-03 | End: 2024-09-03

## 2024-07-03 RX ORDER — INSULIN GLARGINE 100 [IU]/ML
24 INJECTION, SOLUTION SUBCUTANEOUS NIGHTLY
Refills: 0 | OUTPATIENT
Start: 2024-07-03

## 2024-07-03 NOTE — TELEPHONE ENCOUNTER
Doesn't have home blood pressure cuff  Last blood sugar was July 1 204-first thing in the morning

## 2024-07-15 DIAGNOSIS — E11.9 TYPE 2 DIABETES MELLITUS WITHOUT COMPLICATION, WITHOUT LONG-TERM CURRENT USE OF INSULIN (HCC): Primary | ICD-10-CM

## 2024-07-15 RX ORDER — GLIPIZIDE 10 MG/1
10 TABLET ORAL 2 TIMES DAILY
Qty: 180 TABLET | Refills: 1 | Status: SHIPPED | OUTPATIENT
Start: 2024-07-15

## 2024-07-15 NOTE — TELEPHONE ENCOUNTER
Diabetes Medication Protocol Ykmvxr49/15/2024 11:03 AM   Protocol Details Last A1C < 7.5 and within past 6 months    EGFRCR or GFRNAA > 50    In person appointment or virtual visit in the past 6 mos or appointment in next 3 mos    Microalbumin procedure in past 12 months or taking ACE/ARB    GFR in the past 12 months      Last office visit 5/14/24  Last refilled on 5/23/24 for # 180 with 0 refills  Future Appointments   Date Time Provider Department Center   10/14/2024 11:20 AM Shayna Zelaya APRN [facility] [place]        Thank you.

## 2024-08-08 DIAGNOSIS — E11.9 TYPE 2 DIABETES MELLITUS WITHOUT COMPLICATION, WITHOUT LONG-TERM CURRENT USE OF INSULIN (HCC): ICD-10-CM

## 2024-08-08 RX ORDER — LISINOPRIL 2.5 MG/1
2.5 TABLET ORAL DAILY
Qty: 90 TABLET | Refills: 0 | Status: SHIPPED | OUTPATIENT
Start: 2024-08-08

## 2024-08-08 NOTE — TELEPHONE ENCOUNTER
Does patient have way to check blood pressure at home? If not, please schedule RN visit to check blood pressure

## 2024-08-08 NOTE — TELEPHONE ENCOUNTER
LOV: 5/14/24 for type 2 diabetes      LISINOPRIL 2.5 MG Oral Tab  TAKE 1 TABLET BY MOUTH EVERY DAY Dispense: 90 tablet, Refills: 0 ordered        05/15/2024     Future Appointments   Date Time Provider Department Center   10/14/2024 11:20 AM Shayna Zelaya APRN [facility] [place]

## 2024-08-08 NOTE — TELEPHONE ENCOUNTER
Patient does not have a Blood Pressure Cuff at home.  Offered to schedule a BP appointment.  Patient refused.  States he is not worried about his BP.   Patient states when he gets to a pharmacy, he will check his blood pressure there and will let us know what it is.

## 2024-08-10 RX ORDER — TRIAMTERENE AND HYDROCHLOROTHIAZIDE 37.5; 25 MG/1; MG/1
1 TABLET ORAL DAILY
Qty: 90 TABLET | Refills: 0 | Status: SHIPPED | OUTPATIENT
Start: 2024-08-10

## 2024-08-10 NOTE — TELEPHONE ENCOUNTER
LOV: 4/23/24 for type 2 diabetes  Patient states he does not have BP cuff at home but plans to check it next time he goes to the pharmacy    TRIAMTERENE-HCTZ 37.5-25 MG Oral Tab  TAKE 1 TABLET BY MOUTH EVERY DAY Dispense: 90 tablet, Refills: 0 ordered        05/23/2024     Future Appointments   Date Time Provider Department Center   10/14/2024 11:20 AM Shayna Zelaya APRN [facility] [place]

## 2024-08-22 ENCOUNTER — TELEPHONE (OUTPATIENT)
Dept: FAMILY MEDICINE CLINIC | Facility: CLINIC | Age: 84
End: 2024-08-22

## 2024-08-22 DIAGNOSIS — E11.9 TYPE 2 DIABETES MELLITUS WITHOUT COMPLICATION, WITHOUT LONG-TERM CURRENT USE OF INSULIN (HCC): Primary | ICD-10-CM

## 2024-08-22 DIAGNOSIS — E78.49 OTHER HYPERLIPIDEMIA: ICD-10-CM

## 2024-08-22 DIAGNOSIS — E03.9 ACQUIRED HYPOTHYROIDISM: ICD-10-CM

## 2024-08-22 RX ORDER — LEVOTHYROXINE SODIUM 25 UG/1
25 TABLET ORAL
Qty: 30 TABLET | Refills: 0 | Status: SHIPPED | OUTPATIENT
Start: 2024-08-22

## 2024-08-22 NOTE — TELEPHONE ENCOUNTER
1 month supply sent  Please have patient get lab work done. Does not need to fast  Please also have patient stop in and get blood pressure checked. Thank you!

## 2024-08-22 NOTE — TELEPHONE ENCOUNTER
LOV: 5/14/24 for type 2 diabetes        LEVOTHYROXINE 25 MCG Oral Tab  TAKE 1 TABLET BY MOUTH EVERY DAY BEFORE BREAKFAST Dispense: 90 tablet, Refills: 0 ordered        05/23/2024     Future Appointments   Date Time Provider Department Center   10/14/2024 11:20 AM Shayna Zelaya APRN [facility] [place]

## 2024-08-28 RX ORDER — PEN NEEDLE, DIABETIC 29 G X1/2"
NEEDLE, DISPOSABLE MISCELLANEOUS
Qty: 300 EACH | Refills: 0 | Status: SHIPPED | OUTPATIENT
Start: 2024-08-28

## 2024-08-28 NOTE — TELEPHONE ENCOUNTER
Left message to call office back     Future Appointments   Date Time Provider Department Center   10/14/2024 11:20 AM Shayna Zelaya APRN [facility] [place]

## 2024-08-29 RX ORDER — AMLODIPINE BESYLATE 5 MG/1
5 TABLET ORAL DAILY
Qty: 90 TABLET | Refills: 0 | Status: SHIPPED | OUTPATIENT
Start: 2024-08-29

## 2024-08-29 NOTE — TELEPHONE ENCOUNTER
Amlodipine last refilled 5/15/24  Future appointment with Halima 10/14/24  LOV with  5/14/24  Last labs 4/24/24      Hypertension Medications Protocol Mdtnrr1908/29/2024 12:50 AM   Protocol Details Last BP reading less than 140/90    EGFRCR or GFRNAA > 50    CMP or BMP in past 12 months    In person appointment or virtual visit in the past 12 mos or appointment in next 3 mos

## 2024-08-29 NOTE — TELEPHONE ENCOUNTER
Patient purchased a BP cuff today.  States when he took his BP it was 116/63  Does he still need to schedule BP f/u?

## 2024-08-30 ENCOUNTER — LABORATORY ENCOUNTER (OUTPATIENT)
Dept: LAB | Age: 84
End: 2024-08-30
Attending: NURSE PRACTITIONER
Payer: MEDICARE

## 2024-08-30 DIAGNOSIS — E03.9 ACQUIRED HYPOTHYROIDISM: ICD-10-CM

## 2024-08-30 DIAGNOSIS — E11.9 TYPE 2 DIABETES MELLITUS WITHOUT COMPLICATION, WITHOUT LONG-TERM CURRENT USE OF INSULIN (HCC): ICD-10-CM

## 2024-08-30 DIAGNOSIS — E78.49 OTHER HYPERLIPIDEMIA: ICD-10-CM

## 2024-08-30 LAB
ALBUMIN SERPL-MCNC: 4.8 G/DL (ref 3.2–4.8)
ALBUMIN/GLOB SERPL: 2.1 {RATIO} (ref 1–2)
ALP LIVER SERPL-CCNC: 65 U/L
ALT SERPL-CCNC: 48 U/L
ANION GAP SERPL CALC-SCNC: 8 MMOL/L (ref 0–18)
AST SERPL-CCNC: 32 U/L (ref ?–34)
BILIRUB SERPL-MCNC: 1 MG/DL (ref 0.2–1.1)
BUN BLD-MCNC: 27 MG/DL (ref 9–23)
CALCIUM BLD-MCNC: 9.9 MG/DL (ref 8.7–10.4)
CHLORIDE SERPL-SCNC: 99 MMOL/L (ref 98–112)
CHOLEST SERPL-MCNC: 146 MG/DL (ref ?–200)
CO2 SERPL-SCNC: 25 MMOL/L (ref 21–32)
CREAT BLD-MCNC: 1.77 MG/DL
CREAT UR-SCNC: 83.1 MG/DL
EGFRCR SERPLBLD CKD-EPI 2021: 38 ML/MIN/1.73M2 (ref 60–?)
EST. AVERAGE GLUCOSE BLD GHB EST-MCNC: 243 MG/DL (ref 68–126)
FASTING PATIENT LIPID ANSWER: NO
FASTING STATUS PATIENT QL REPORTED: NO
GLOBULIN PLAS-MCNC: 2.3 G/DL (ref 2–3.5)
GLUCOSE BLD-MCNC: 342 MG/DL (ref 70–99)
HBA1C MFR BLD: 10.1 % (ref ?–5.7)
HDLC SERPL-MCNC: 29 MG/DL (ref 40–59)
LDLC SERPL CALC-MCNC: 77 MG/DL (ref ?–100)
MICROALBUMIN UR-MCNC: 4.7 MG/DL
MICROALBUMIN/CREAT 24H UR-RTO: 56.6 UG/MG (ref ?–30)
NONHDLC SERPL-MCNC: 117 MG/DL (ref ?–130)
OSMOLALITY SERPL CALC.SUM OF ELEC: 293 MOSM/KG (ref 275–295)
POTASSIUM SERPL-SCNC: 4.6 MMOL/L (ref 3.5–5.1)
PROT SERPL-MCNC: 7.1 G/DL (ref 5.7–8.2)
SODIUM SERPL-SCNC: 132 MMOL/L (ref 136–145)
T4 FREE SERPL-MCNC: 1.3 NG/DL (ref 0.8–1.7)
TRIGL SERPL-MCNC: 239 MG/DL (ref 30–149)
TSI SER-ACNC: 4.34 MIU/ML (ref 0.55–4.78)
VLDLC SERPL CALC-MCNC: 37 MG/DL (ref 0–30)

## 2024-08-30 PROCEDURE — 82043 UR ALBUMIN QUANTITATIVE: CPT

## 2024-08-30 PROCEDURE — 36415 COLL VENOUS BLD VENIPUNCTURE: CPT

## 2024-08-30 PROCEDURE — 83036 HEMOGLOBIN GLYCOSYLATED A1C: CPT

## 2024-08-30 PROCEDURE — 84443 ASSAY THYROID STIM HORMONE: CPT

## 2024-08-30 PROCEDURE — 82570 ASSAY OF URINE CREATININE: CPT

## 2024-08-30 PROCEDURE — 80061 LIPID PANEL: CPT

## 2024-08-30 PROCEDURE — 80053 COMPREHEN METABOLIC PANEL: CPT

## 2024-08-30 PROCEDURE — 84439 ASSAY OF FREE THYROXINE: CPT

## 2024-09-03 ENCOUNTER — TELEPHONE (OUTPATIENT)
Dept: FAMILY MEDICINE CLINIC | Facility: CLINIC | Age: 84
End: 2024-09-03

## 2024-09-03 DIAGNOSIS — E11.9 TYPE 2 DIABETES MELLITUS WITHOUT COMPLICATION, WITHOUT LONG-TERM CURRENT USE OF INSULIN (HCC): Primary | ICD-10-CM

## 2024-09-03 RX ORDER — INSULIN GLARGINE 100 [IU]/ML
26 INJECTION, SOLUTION SUBCUTANEOUS NIGHTLY
Qty: 23.4 ML | Refills: 0 | Status: SHIPPED | OUTPATIENT
Start: 2024-09-03 | End: 2024-09-03

## 2024-09-03 RX ORDER — INSULIN GLARGINE 100 [IU]/ML
24 INJECTION, SOLUTION SUBCUTANEOUS NIGHTLY
Qty: 30 ML | Refills: 0 | Status: SHIPPED | OUTPATIENT
Start: 2024-09-03 | End: 2024-09-03

## 2024-09-03 RX ORDER — INSULIN GLARGINE 100 [IU]/ML
26 INJECTION, SOLUTION SUBCUTANEOUS NIGHTLY
Qty: 23.4 ML | Refills: 0 | Status: SHIPPED | OUTPATIENT
Start: 2024-09-03 | End: 2024-12-02

## 2024-09-03 NOTE — TELEPHONE ENCOUNTER
----- Message from Shayna Zelaya sent at 9/3/2024  8:44 AM CDT -----  Results reviewed.   Urine micro increased from previous, this is due to poorly controlled diabetes  Very poorly controlled diabetes  Decreased kidney function and hyperglycemia  LDL near goal, triglycerides elevated    He needs to see endocrinology. Refer to Maci BRUNNER

## 2024-09-03 NOTE — TELEPHONE ENCOUNTER
Called patient back to inform him to increase him insulin to 32.    Patient states has not taken 30 units.  Only takes 24 units due to what the pharmacy told him to take in [place]  Pharmacy told him they could not increased his dose due to insurance.    Patient has not been on Jardiance at all.  Takes 1200mg fish oil twice a day.  Takes iron supplement daily.    Blood sugar yesterday 299  Blood pressure today 120/80 - taken at home.

## 2024-09-03 NOTE — TELEPHONE ENCOUNTER
Notified patient - patient verbalized understanding.    Patient states does not like being referred to other specialities.  Also, does not have a primary in [place] anymore.

## 2024-09-03 NOTE — TELEPHONE ENCOUNTER
His diabetes is not well-controlled  He needs to increase the insulin to 26 units nightly   Check FBS daily and send log in 3 days   New prescription sent

## 2024-09-03 NOTE — TELEPHONE ENCOUNTER
Maci Benavidez APRN [address] [address] [phone]     Spoke with patient - given MYESHA Nielsen recommendations regarding labs.    Urine micro increased from previous, this is due to poorly controlled diabetes  Very poorly controlled diabetes  Decreased kidney function and hyperglycemia  LDL near goal, triglycerides elevated    Patient states I'm going to eat what I want to.  States Dr. Weiss had decreased his Metformin to once a day.  Then he increased his Metformin back to twice a day but decreased it again 2 weeks before labs.    MYESHA Petty office number given but states he will not follow-up.    Future Appointments   Date Time Provider Department Center   10/14/2024 11:20 AM Shayna Zelaya APRN [facility] [place]

## 2024-09-04 ENCOUNTER — TELEPHONE (OUTPATIENT)
Dept: FAMILY MEDICINE CLINIC | Facility: CLINIC | Age: 84
End: 2024-09-04

## 2024-09-11 NOTE — TELEPHONE ENCOUNTER
Diabetes Medication Protocol Plyzez2409/11/2024 12:37 AM   Protocol Details Last A1C < 7.5 and within past 6 months    EGFRCR or GFRNAA > 50    In person appointment or virtual visit in the past 6 mos or appointment in next 3 mos    Microalbumin procedure in past 12 months or taking ACE/ARB    GFR in the past 12 months      Last office visit 5/14/24  Last refilled on 3/18/24 for # 180 with 1 refills  Future Appointments   Date Time Provider Department Center   10/14/2024 11:20 AM Shayna Zelaya APRN [facility] [place]        Thank you.

## 2024-09-16 DIAGNOSIS — E03.9 ACQUIRED HYPOTHYROIDISM: ICD-10-CM

## 2024-09-16 RX ORDER — LEVOTHYROXINE SODIUM 25 UG/1
25 TABLET ORAL
Qty: 90 TABLET | Refills: 1 | Status: SHIPPED | OUTPATIENT
Start: 2024-09-16 | End: 2024-12-15

## 2024-09-16 NOTE — TELEPHONE ENCOUNTER
Last refill: 8/22/24  Last office visit:5/14/24  Last labs: 4/24/24/    Future appt: with Halima on 10/14/24.    Need refill of levothyroxine 25 MCG.

## 2024-10-14 ENCOUNTER — OFFICE VISIT (OUTPATIENT)
Dept: FAMILY MEDICINE CLINIC | Facility: CLINIC | Age: 84
End: 2024-10-14
Payer: MEDICARE

## 2024-10-14 ENCOUNTER — LAB ENCOUNTER (OUTPATIENT)
Dept: LAB | Age: 84
End: 2024-10-14
Attending: NURSE PRACTITIONER
Payer: MEDICARE

## 2024-10-14 VITALS
SYSTOLIC BLOOD PRESSURE: 106 MMHG | BODY MASS INDEX: 32.76 KG/M2 | TEMPERATURE: 98 F | HEART RATE: 76 BPM | DIASTOLIC BLOOD PRESSURE: 52 MMHG | RESPIRATION RATE: 18 BRPM | HEIGHT: 71 IN | WEIGHT: 234 LBS | OXYGEN SATURATION: 99 %

## 2024-10-14 DIAGNOSIS — E66.09 CLASS 1 OBESITY DUE TO EXCESS CALORIES WITH SERIOUS COMORBIDITY AND BODY MASS INDEX (BMI) OF 32.0 TO 32.9 IN ADULT: ICD-10-CM

## 2024-10-14 DIAGNOSIS — E11.65 UNCONTROLLED TYPE 2 DIABETES MELLITUS WITH HYPERGLYCEMIA (HCC): ICD-10-CM

## 2024-10-14 DIAGNOSIS — Z00.00 ENCOUNTER FOR ANNUAL HEALTH EXAMINATION: Primary | ICD-10-CM

## 2024-10-14 DIAGNOSIS — Z79.4 TYPE 2 DIABETES MELLITUS WITH HYPERGLYCEMIA, WITH LONG-TERM CURRENT USE OF INSULIN (HCC): ICD-10-CM

## 2024-10-14 DIAGNOSIS — E78.49 OTHER HYPERLIPIDEMIA: ICD-10-CM

## 2024-10-14 DIAGNOSIS — E11.65 TYPE 2 DIABETES MELLITUS WITH HYPERGLYCEMIA, WITH LONG-TERM CURRENT USE OF INSULIN (HCC): ICD-10-CM

## 2024-10-14 DIAGNOSIS — E03.9 ACQUIRED HYPOTHYROIDISM: ICD-10-CM

## 2024-10-14 DIAGNOSIS — E66.811 CLASS 1 OBESITY DUE TO EXCESS CALORIES WITH SERIOUS COMORBIDITY AND BODY MASS INDEX (BMI) OF 32.0 TO 32.9 IN ADULT: ICD-10-CM

## 2024-10-14 DIAGNOSIS — N18.32 STAGE 3B CHRONIC KIDNEY DISEASE (HCC): ICD-10-CM

## 2024-10-14 DIAGNOSIS — I10 PRIMARY HYPERTENSION: ICD-10-CM

## 2024-10-14 LAB
ANION GAP SERPL CALC-SCNC: 8 MMOL/L (ref 0–18)
BUN BLD-MCNC: 28 MG/DL (ref 9–23)
CALCIUM BLD-MCNC: 10 MG/DL (ref 8.7–10.4)
CHLORIDE SERPL-SCNC: 101 MMOL/L (ref 98–112)
CO2 SERPL-SCNC: 23 MMOL/L (ref 21–32)
CREAT BLD-MCNC: 1.58 MG/DL
EGFRCR SERPLBLD CKD-EPI 2021: 43 ML/MIN/1.73M2 (ref 60–?)
FASTING STATUS PATIENT QL REPORTED: NO
GLUCOSE BLD-MCNC: 257 MG/DL (ref 70–99)
OSMOLALITY SERPL CALC.SUM OF ELEC: 288 MOSM/KG (ref 275–295)
POTASSIUM SERPL-SCNC: 4.3 MMOL/L (ref 3.5–5.1)
SODIUM SERPL-SCNC: 132 MMOL/L (ref 136–145)

## 2024-10-14 PROCEDURE — 83036 HEMOGLOBIN GLYCOSYLATED A1C: CPT

## 2024-10-14 PROCEDURE — 80048 BASIC METABOLIC PNL TOTAL CA: CPT

## 2024-10-14 PROCEDURE — 36415 COLL VENOUS BLD VENIPUNCTURE: CPT

## 2024-10-14 RX ORDER — INSULIN GLARGINE 100 [IU]/ML
28 INJECTION, SOLUTION SUBCUTANEOUS NIGHTLY
Qty: 8.4 ML | Refills: 0 | Status: SHIPPED | OUTPATIENT
Start: 2024-10-14 | End: 2024-11-13

## 2024-10-14 NOTE — PATIENT INSTRUCTIONS
Decrease Metformin to 500 mg once daily  Increase Basaglar insulin to 28 units nightly  Call with fasting blood sugars in 1 week  Establish with endocrinologist in [place]

## 2024-10-14 NOTE — PROGRESS NOTES
Subjective:   Shravan Castellano is a 83 year old male who presents for a Medicare Subsequent Annual Wellness visit (Pt already had Initial Annual Wellness) and scheduled follow up of multiple significant but stable problems.     No concerns  Lives in [facility]  Does not see endocrinology  Seeing cardiology in [place] last saw in March 2024.     Last A1c value was 10.1% done 8/30/2024.  Recent Results (from the past 4380 hours)   Comp Metabolic Panel (14) [E]    Collection Time: 08/30/24  9:54 AM   Result Value Ref Range    Glucose 342 (H) 70 - 99 mg/dL    Sodium 132 (L) 136 - 145 mmol/L    Potassium 4.6 3.5 - 5.1 mmol/L    Chloride 99 98 - 112 mmol/L    CO2 25.0 21.0 - 32.0 mmol/L    Anion Gap 8 0 - 18 mmol/L    BUN 27 (H) 9 - 23 mg/dL    Creatinine 1.77 (H) 0.70 - 1.30 mg/dL    Calcium, Total 9.9 8.7 - 10.4 mg/dL    Calculated Osmolality 293 275 - 295 mOsm/kg    eGFR-Cr 38 (L) >=60 mL/min/1.73m2    AST 32 <34 U/L    ALT 48 10 - 49 U/L    Alkaline Phosphatase 65 45 - 117 U/L    Bilirubin, Total 1.0 0.2 - 1.1 mg/dL    Total Protein 7.1 5.7 - 8.2 g/dL    Albumin 4.8 3.2 - 4.8 g/dL    Globulin  2.3 2.0 - 3.5 g/dL    A/G Ratio 2.1 (H) 1.0 - 2.0    Patient Fasting for CMP? No     Cholesterol: 146, done on 8/30/2024.  HDL Cholesterol: 29, done on 8/30/2024.  LDL Cholesterol: 77, done on 8/30/2024.  TriGlycerides 239, done on 8/30/2024.     History/Other:   Fall Risk Assessment:   He has been screened for Falls and is High Risk. Fall Prevention information provided to patient in After Visit Summary.    Do you feel unsteady when standing or walking?: No  Do you worry about falling?: Yes  Have you fallen in the past year?: No     Cognitive Assessment:   He had a completely normal cognitive assessment - see flowsheet entries       Functional Ability/Status:   Shravan Castellano has some abnormal functions as listed below:  He has Vision problems based on screening of functional status. He has Walking  problems based on screening of functional status.       Depression Screening (PHQ):  PHQ-2 SCORE: 0  , done 10/14/2024     Advanced Directives:   He does have a Living Will but we do NOT have it on file in Epic.    He does have a POA but we do NOT have it on file in Epic.      Patient Active Problem List   Diagnosis    Hyperlipidemia    Type 2 diabetes mellitus without complication, without long-term current use of insulin (HCC)    Primary hypertension    Acquired hypothyroidism    Class 1 obesity due to excess calories with serious comorbidity and body mass index (BMI) of 32.0 to 32.9 in adult    Stage 3b chronic kidney disease (HCC)    Uncontrolled type 2 diabetes mellitus with hyperglycemia (HCC)     Allergies:  He has No Known Allergies.    Current Medications:  Outpatient Medications Marked as Taking for the 10/14/24 encounter (Office Visit) with Shayna Zelaya APRN   Medication Sig    insulin glargine (BASAGLAR KWIKPEN) 100 UNIT/ML Subcutaneous Solution Pen-injector Inject 28 Units into the skin nightly.    metFORMIN HCl 1000 MG Oral Tab Take 0.5 tablets (500 mg total) by mouth daily with breakfast.    levothyroxine 25 MCG Oral Tab Take 1 tablet (25 mcg total) by mouth before breakfast.    AMLODIPINE 5 MG Oral Tab TAKE 1 TABLET (5 MG TOTAL) BY MOUTH DAILY.    Insulin Pen Needle (BD PEN NEEDLE ORIGINAL U/F) 29G X 12.7MM Does not apply Misc INJECT 10 UNITS INTO THE SKIN NIGHTLY. - SUBCUTANEOUS    TRIAMTERENE-HCTZ 37.5-25 MG Oral Tab TAKE 1 TABLET BY MOUTH EVERY DAY    LISINOPRIL 2.5 MG Oral Tab TAKE 1 TABLET BY MOUTH EVERY DAY    glipiZIDE 10 MG Oral Tab TAKE 1 TABLET (10 MG TOTAL) BY MOUTH IN THE MORNING AND BEFORE BEDTIME    metoprolol succinate ER 50 MG Oral Tablet 24 Hr Take 1 tablet (50 mg total) by mouth daily.    FENOFIBRATE 145 MG Oral Tab TAKE 1 TABLET BY MOUTH EVERY DAY    atorvastatin 20 MG Oral Tab TAKE 1 TABLET BY MOUTH EVERYDAY AT BEDTIME    isosorbide mononitrate ER 30 MG Oral Tablet 24 Hr  Take 1 tablet (30 mg total) by mouth daily.    ferrous sulfate 325 (65 FE) MG Oral Tab EC Take 1 tablet (325 mg total) by mouth daily with breakfast.    Omega-3 Fatty Acids (FISH OIL) 1200 MG Oral Cap Take by mouth.    Multiple Vitamin (MULTI-DAY) Oral Tab Take by mouth.       Medical History:  He  has no past medical history on file.  Surgical History:  He  has no past surgical history on file.   Family History:  His family history is not on file.  Social History:  He  reports that he has never smoked. He has been exposed to tobacco smoke. He has never used smokeless tobacco. He reports that he does not currently use alcohol. He reports that he does not currently use drugs.    Tobacco:  He has never smoked tobacco.    CAGE Alcohol Screen:   CAGE screening score of 0 on 10/14/2024, showing low risk of alcohol abuse.      Patient Care Team:  Alisha Weiss MD as PCP - General (Family Medicine)    Review of Systems   Constitutional:  Negative for activity change, fatigue and fever.   HENT:  Negative for congestion, rhinorrhea and sore throat.    Respiratory:  Negative for chest tightness and shortness of breath.    Cardiovascular:  Negative for chest pain and palpitations.   Gastrointestinal:  Negative for abdominal pain, blood in stool, constipation, diarrhea, nausea and vomiting.   Musculoskeletal:  Positive for arthralgias. Negative for myalgias.   Neurological:  Negative for dizziness and light-headedness.       Objective:   Physical Exam  Constitutional:       Appearance: Normal appearance. He is obese.   HENT:      Head: Normocephalic and atraumatic.   Cardiovascular:      Rate and Rhythm: Normal rate and regular rhythm.      Pulses: Normal pulses.      Heart sounds: Normal heart sounds.   Pulmonary:      Effort: Pulmonary effort is normal. No respiratory distress.      Breath sounds: Normal breath sounds.   Skin:     Capillary Refill: Capillary refill takes more than 3 seconds.   Neurological:      General:  No focal deficit present.      Mental Status: He is alert and oriented to person, place, and time.   Psychiatric:         Mood and Affect: Mood normal.         Behavior: Behavior normal.         Thought Content: Thought content normal.         Judgment: Judgment normal.       /52 (BP Location: Left arm, Patient Position: Sitting, Cuff Size: adult)   Pulse 76   Temp 97.9 °F (36.6 °C) (Temporal)   Resp 18   Ht 5' 11\" (1.803 m)   Wt 234 lb (106.1 kg)   SpO2 99%   BMI 32.64 kg/m²  Estimated body mass index is 32.64 kg/m² as calculated from the following:    Height as of this encounter: 5' 11\" (1.803 m).    Weight as of this encounter: 234 lb (106.1 kg).    Medicare Hearing Assessment:   Hearing Screening    Time taken: 10/14/2024 10:53 AM  Screening Method: Finger Rub  Finger Rub Result: Pass       Visual Acuity:   Right Eye Visual Acuity: Corrected Right Eye Chart Acuity: 20/30   Left Eye Visual Acuity: Corrected Left Eye Chart Acuity: 20/30   Both Eyes Visual Acuity: Corrected Both Eyes Chart Acuity: 20/30   Able To Tolerate Visual Acuity: Yes        Assessment & Plan:   Shravan Castellano is a 83 year old male who presents for a Medicare Assessment.     1. Encounter for annual health examination (Primary)  2. Type 2 diabetes mellitus with hyperglycemia, with long-term current use of insulin ([facility])  Comments:  Last A1C not controlled. Self increased Metformin but Creatinine elevated. Decrease Metformin to 500 mg daily & increase LA insulin. Check labs today  Orders:  -     Basic Metabolic Panel (8); Future; Expected date: 10/14/2024  -     Hemoglobin A1C; Future; Expected date: 10/14/2024  -     Hemoglobin A1C; Future; Expected date: 01/14/2025  -     Comp Metabolic Panel (14); Future; Expected date: 01/14/2025  -     Lipid Panel; Future; Expected date: 01/14/2025  -     Hemoglobin A1C  -     Comp Metabolic Panel (14)  -     Lipid Panel  3. Uncontrolled type 2 diabetes mellitus with hyperglycemia  (HCC)  Comments:  Needs to establish with endocrinology in [place]. Also plan to recheck labs in 3 months  4. Other hyperlipidemia  Comments:  Cholesterol at goal. Continue statin. Seeing cardiology  5. Primary hypertension  Comments:  BP at goal. Following with cardiology  6. Acquired hypothyroidism  Comments:  TSH at goal. Continue current dose Levothyroxine  7. Stage 3b chronic kidney disease (HCC)  Comments:  Needs to decrease Metformin & work on blood sugar control. Recheck kidney function today  8. Class 1 obesity due to excess calories with serious comorbidity and body mass index (BMI) of 32.0 to 32.9 in adult  Other orders  -     Basaglar KwikPen; Inject 28 Units into the skin nightly.  Dispense: 8.4 mL; Refill: 0  -     metFORMIN HCl; Take 0.5 tablets (500 mg total) by mouth daily with breakfast.  Dispense: 45 tablet; Refill: 0  The patient indicates understanding of these issues and agrees to the plan.  Lab work ordered.  Reinforced healthy diet, lifestyle, and exercise.  Establish with Endocrinology  Decrease Metformin to 500 mg once daily  Increase Basaglar insulin to 28 units nightly  Call with fasting blood sugars in 1 week        Return in 3 months (on 1/14/2025).     Shayna Zelaya, MYESHA, 10/14/2024     Supplementary Documentation:   General Health:  In the past six months, have you lost more than 10 pounds without trying?: 2 - No  Has your appetite been poor?: No  Type of Diet: Balanced;Diabetic  How does the patient maintain a good energy level?: Daily Walks  How would you describe your daily physical activity?: Light  How would you describe your current health state?: Good  How do you maintain positive mental well-being?: Visiting Family  On a scale of 0 to 10, with 0 being no pain and 10 being severe pain, what is your pain level?: 4 - (Moderate)  At any time do you feel concerned for the safety/well-being of yourself and/or your children, in your home or elsewhere?: Yes  Have you had any  immunizations at another office such as Influenza, Hepatitis B, Tetanus, or Pneumococcal?: No    Health Maintenance   Topic Date Due    Fall Risk Screening (Annual)  01/01/2024    Annual Physical  08/24/2024    COVID-19 Vaccine (1 - 2023-24 season) Never done    Influenza Vaccine (1) Never done    Diabetes Care Dilated Eye Exam  12/13/2024    Pneumococcal Vaccine: 65+ Years (1 of 2 - PCV) 05/01/2025 (Originally 12/30/1946)    Zoster Vaccines (1 of 2) 05/01/2025 (Originally 12/30/1990)    Diabetes Care A1C  11/30/2024    Diabetes Care Foot Exam  04/23/2025    Diabetes Care: GFR  08/30/2025    Diabetes Care: Microalb/Creat Ratio  08/30/2025    Annual Depression Screening  Completed       Initial evaluation performed by ALIYA Sarmiento.     Patient discussed with student. Independent exam performed. Agree with assessment and plan.

## 2024-10-15 ENCOUNTER — TELEPHONE (OUTPATIENT)
Dept: FAMILY MEDICINE CLINIC | Facility: CLINIC | Age: 84
End: 2024-10-15

## 2024-10-15 LAB
EST. AVERAGE GLUCOSE BLD GHB EST-MCNC: 263 MG/DL (ref 68–126)
HBA1C MFR BLD: 10.8 % (ref ?–5.7)

## 2024-10-15 NOTE — TELEPHONE ENCOUNTER
Spoke with wife - unable to speak with patient at this time due to him driving.    Reviewed instructions:  Stop Metformin  Insulin 28 units as discussed at yesterday's visit  Send Fasting blood sugars in 1 week  Follow-up with endocrinologist and nephrologist in [place]

## 2024-10-16 NOTE — TELEPHONE ENCOUNTER
Attempted to call patient - spoke with wife again. Pt is driving again to [place].  Explained that on verbal consent - she is not listed to speak with even though she was at his office visit.    Asked that patient call our office back and will review instructions.

## 2024-10-24 ENCOUNTER — TELEPHONE (OUTPATIENT)
Dept: FAMILY MEDICINE CLINIC | Facility: CLINIC | Age: 84
End: 2024-10-24

## 2024-10-24 RX ORDER — INSULIN GLARGINE 100 [IU]/ML
30 INJECTION, SOLUTION SUBCUTANEOUS NIGHTLY
COMMUNITY
Start: 2024-10-24

## 2024-10-24 NOTE — TELEPHONE ENCOUNTER
Increase Basaglar insulin to 30 units nightly (he supposed to be on 28 units). Please update med list  Send FBS daily on Monday  Schedule with endocrinology

## 2024-10-24 NOTE — TELEPHONE ENCOUNTER
Spoke with the patient  Stopped metformin  States blood sugar has been in low 300s and now in high 200s  Feeling well  Not scheduled with endo yet  Just got to [place] on Tuesday

## 2024-11-04 ENCOUNTER — TELEPHONE (OUTPATIENT)
Dept: FAMILY MEDICINE CLINIC | Facility: CLINIC | Age: 84
End: 2024-11-04

## 2024-11-04 RX ORDER — ATORVASTATIN CALCIUM 20 MG/1
20 TABLET, FILM COATED ORAL NIGHTLY
Qty: 90 TABLET | Refills: 0 | Status: SHIPPED | OUTPATIENT
Start: 2024-11-04

## 2024-11-04 RX ORDER — FENOFIBRATE 145 MG/1
145 TABLET, COATED ORAL DAILY
Qty: 90 TABLET | Refills: 0 | Status: SHIPPED | OUTPATIENT
Start: 2024-11-04

## 2024-11-04 NOTE — TELEPHONE ENCOUNTER
Patient called in with blood sugar results.  Blood sugar:  Nov 1 - 311  Nov 3 - 330    Blood pressure - 140/69    Reminded patient he needs to follow-up with an endocrinologist while in [place]  Patient asked what an endocrinologist does - explained it was very important for someone to follow his blood sugar.  Patient will ask his cardiologist in [place] who they recommend.    Patient did stop taking his Metformin.    Reminder placed to call patient in 1 month to see if followed-up with an endocrinologist.

## 2024-11-04 NOTE — TELEPHONE ENCOUNTER
Patient called back -   Increase Basaglar insulin to 34 units nightly.   To call with blood sugars in 3 days to assess the change in insulin.  Patient has appointment with cardiology this morning - will discuss elevated blood pressure

## 2024-11-04 NOTE — TELEPHONE ENCOUNTER
Increase Basaglar insulin to 34 units nightly.  Med list updated  Send FBS over next 3 days  Agree with seeing endocrinology. We had that conversation at his recent visit  Call cardiologist regarding elevated blood pressure

## 2024-11-12 ENCOUNTER — TELEPHONE (OUTPATIENT)
Dept: FAMILY MEDICINE CLINIC | Facility: CLINIC | Age: 84
End: 2024-11-12

## 2024-11-12 RX ORDER — INSULIN GLARGINE 100 [IU]/ML
36 INJECTION, SOLUTION SUBCUTANEOUS NIGHTLY
COMMUNITY
Start: 2024-11-12

## 2024-11-12 NOTE — TELEPHONE ENCOUNTER
Spoke with patient - instructed to increase insulin to 36 units nightly and contact cardiologist to find an endocrinologist.  Reinforced need to see the specialist now before worsening to a dialysis need.  Patient verbalized understanding.

## 2024-11-12 NOTE — TELEPHONE ENCOUNTER
Patient called in with blood sugar levels.    Takes insulin at night between 11pm and midnight.  Takes blood sugar in the morning.    When taking 30 units -     When taking 34 units -  - [phone]    Has still not established with an endocrinologist yet.  States will talk with his cardiologist next week for a recommendation.

## 2024-11-14 RX ORDER — ATORVASTATIN CALCIUM 20 MG/1
20 TABLET, FILM COATED ORAL NIGHTLY
Qty: 90 TABLET | Refills: 0 | Status: SHIPPED | OUTPATIENT
Start: 2024-11-14

## 2024-11-14 NOTE — TELEPHONE ENCOUNTER
LOV: 10/14/24 for physical    ATORVASTATIN 20 MG Oral Tab  TAKE 1 TABLET BY MOUTH EVERYDAY AT BEDTIME Dispense: 90 tablet, Refills: 0 ordered        11/04/2024     No future appointments.

## 2024-11-14 NOTE — TELEPHONE ENCOUNTER
Rx refill requested     ATORVASTATIN 20 MG Oral Tab       [id number] [place] - [address] [address] [facility] AT [facility], [phone], [phone]

## 2024-11-14 NOTE — TELEPHONE ENCOUNTER
Cholesterol Medication Protocol Fnhvat6411/14/2024 12:59 PM   Protocol Details ALT < 80    ALT resulted within past year    Lipid panel within past 12 months    In person appointment or virtual visit in the past 12 mos or appointment in next 3 mos

## 2024-11-25 ENCOUNTER — TELEPHONE (OUTPATIENT)
Dept: FAMILY MEDICINE CLINIC | Facility: CLINIC | Age: 84
End: 2024-11-25

## 2024-11-25 RX ORDER — FENOFIBRATE 145 MG/1
145 TABLET, COATED ORAL DAILY
Qty: 90 TABLET | Refills: 0 | Status: SHIPPED | OUTPATIENT
Start: 2024-11-25

## 2024-12-02 ENCOUNTER — TELEPHONE (OUTPATIENT)
Dept: FAMILY MEDICINE CLINIC | Facility: CLINIC | Age: 84
End: 2024-12-02

## 2024-12-02 RX ORDER — INSULIN GLARGINE 100 [IU]/ML
36 INJECTION, SOLUTION SUBCUTANEOUS NIGHTLY
Qty: 3 ML | Refills: 0 | Status: SHIPPED | OUTPATIENT
Start: 2024-12-02

## 2024-12-02 NOTE — TELEPHONE ENCOUNTER
Last A1c value was 10.8% done 10/14/2024.    I'm not sure what you would like to do with Shravan. He will be in [place] until May. We've told him numerous times to see endocrinology. He first stated he would ask his cardiologist in [place] for recommendation. He was supposed to them in November.

## 2024-12-02 NOTE — TELEPHONE ENCOUNTER
Patient states not yet est with endo  Reinforced concern over worsening A1c and importance of getting est with endo  Verbalized understanding

## 2024-12-02 NOTE — TELEPHONE ENCOUNTER
Refill request for basaglar-  Last refill:11/12/24  Last office visit:10/14/24  Last labs:10/14-A1C on 8/30/24

## 2024-12-11 ENCOUNTER — TELEPHONE (OUTPATIENT)
Dept: FAMILY MEDICINE CLINIC | Facility: CLINIC | Age: 84
End: 2024-12-11

## 2024-12-11 NOTE — TELEPHONE ENCOUNTER
AMLODIPINE 5 MG Oral Tab     [id number] [place] - [address] [address] [facility] AT [facility], [phone], [phone]

## 2024-12-11 NOTE — TELEPHONE ENCOUNTER
Hypertension Medications Protocol Nntocl9512/11/2024 08:34 AM   Protocol Details EGFRCR or GFRNAA > 50    CMP or BMP in past 12 months    Last BP reading less than 140/90    In person appointment or virtual visit in the past 12 mos o      Last office visit 10/14/24  Last refilled on 8/29/24 for # 90 with 0 refills  No future appointments.     Thank you.

## 2024-12-11 NOTE — TELEPHONE ENCOUNTER
Message left on patient's identified cell phone that Amlodipine needs to be refilled by cardiologist that he sees in [place].  Also did he follow-up with an endocrinologist?

## 2024-12-11 NOTE — TELEPHONE ENCOUNTER
This should be managed by his cardiologist. Has he had his appointment with them?  Did he ever schedule with endocrinology?

## 2024-12-12 RX ORDER — AMLODIPINE BESYLATE 5 MG/1
5 TABLET ORAL DAILY
Qty: 90 TABLET | Refills: 0 | OUTPATIENT
Start: 2024-12-12

## 2024-12-12 NOTE — TELEPHONE ENCOUNTER
Spoke with Silvana from Dr. Pineda office  They will take over rx-  they will send rx today  Left detailed message to notify patient.

## 2024-12-12 NOTE — TELEPHONE ENCOUNTER
Patient states he is trying to find an endocrinologist in the area - per the cardiologist there are only a few. Patient states he is trying to find the endocrinologist closes to him.  Patient states the cardiologist did not fill the amlodipine medication  Patient states this is filled by [facility].  Patient saw cardiology yesterday.  Stressed the importance of discussing this medication with patient's cardiologist.  LM for Dr. Reich's office patient's cardiologist to discuss amlodipine.  [facility] recommends cardiology fill this in case other cardiac meds are adjusted.

## 2024-12-20 ENCOUNTER — TELEPHONE (OUTPATIENT)
Dept: FAMILY MEDICINE CLINIC | Facility: CLINIC | Age: 84
End: 2024-12-20

## 2024-12-20 DIAGNOSIS — E11.65 TYPE 2 DIABETES MELLITUS WITH HYPERGLYCEMIA, WITH LONG-TERM CURRENT USE OF INSULIN (HCC): Primary | ICD-10-CM

## 2024-12-20 DIAGNOSIS — Z79.4 TYPE 2 DIABETES MELLITUS WITH HYPERGLYCEMIA, WITH LONG-TERM CURRENT USE OF INSULIN (HCC): Primary | ICD-10-CM

## 2024-12-20 NOTE — TELEPHONE ENCOUNTER
Patient called with endo information    [facility]  Phone [phone]  Fax: [phone]    They will not schedule appt until referral, notes, and labs received    Please adv  Thank you

## 2025-01-14 ENCOUNTER — TELEPHONE (OUTPATIENT)
Dept: FAMILY MEDICINE CLINIC | Facility: CLINIC | Age: 85
End: 2025-01-14

## 2025-01-14 NOTE — TELEPHONE ENCOUNTER
Rx refill requested     LISINOPRIL 2.5 MG Oral Tab      [id number] - [place], [place] - [address] [address] [facility] [address] [facility] AT [facility], [phone], [phone]

## 2025-01-14 NOTE — TELEPHONE ENCOUNTER
Last refill for Lisinopril was 8/8/2024 quantity #90  Last office visit with MYESHA Nielsen on 10/14/2024

## 2025-01-14 NOTE — TELEPHONE ENCOUNTER
Message left on patient's identified cell phone to contact his cardiologist in [place] for the refill.  To call our office back with any questions

## 2025-01-17 NOTE — TELEPHONE ENCOUNTER
States he called an endo last month but they requested we fax referral, office visit notes and labs to schedule him. States he never heard back from that office.  Informed patient that this was done on 12/20/24 and to call back to schedule.  Verbalized understanding.    Referral and office visit notes/labs faxed again    [facility]  Phone [phone]  Fax: [phone]

## 2025-01-23 ENCOUNTER — TELEPHONE (OUTPATIENT)
Dept: FAMILY MEDICINE CLINIC | Facility: CLINIC | Age: 85
End: 2025-01-23

## 2025-01-23 DIAGNOSIS — E11.65 TYPE 2 DIABETES MELLITUS WITH HYPERGLYCEMIA, WITH LONG-TERM CURRENT USE OF INSULIN (HCC): Primary | ICD-10-CM

## 2025-01-23 DIAGNOSIS — Z79.4 TYPE 2 DIABETES MELLITUS WITH HYPERGLYCEMIA, WITH LONG-TERM CURRENT USE OF INSULIN (HCC): Primary | ICD-10-CM

## 2025-01-23 DIAGNOSIS — Z91.199 NON-COMPLIANCE: ICD-10-CM

## 2025-01-23 LAB
ALBUMIN/GLOBULIN RATIO: 2 (CALC) (ref 1–2.5)
ALBUMIN: 4.3 G/DL (ref 3.6–5.1)
ALKALINE PHOSPHATASE: 64 U/L (ref 35–144)
ALT: 24 U/L (ref 9–46)
AST: 17 U/L (ref 10–35)
BILIRUBIN, TOTAL: 0.7 MG/DL (ref 0.2–1.2)
BUN/CREATININE RATIO: 17 (CALC) (ref 6–22)
BUN: 28 MG/DL (ref 7–25)
CALCIUM: 9.4 MG/DL (ref 8.6–10.3)
CARBON DIOXIDE: 26 MMOL/L (ref 20–32)
CHLORIDE: 99 MMOL/L (ref 98–110)
CHOL/HDLC RATIO: 4.5 (CALC)
CHOLESTEROL, TOTAL: 135 MG/DL
CREATININE: 1.65 MG/DL (ref 0.7–1.22)
EGFR: 41 ML/MIN/1.73M2
GLOBULIN: 2.1 G/DL (CALC) (ref 1.9–3.7)
GLUCOSE: 421 MG/DL (ref 65–139)
HDL CHOLESTEROL: 30 MG/DL
HEMOGLOBIN A1C: 11.9 % OF TOTAL HGB
LDL-CHOLESTEROL: 71 MG/DL (CALC)
NON-HDL CHOLESTEROL: 105 MG/DL (CALC)
POTASSIUM: 4.6 MMOL/L (ref 3.5–5.3)
PROTEIN, TOTAL: 6.4 G/DL (ref 6.1–8.1)
SODIUM: 133 MMOL/L (ref 135–146)
TRIGLYCERIDES: 259 MG/DL

## 2025-01-23 RX ORDER — INSULIN GLARGINE 100 [IU]/ML
38 INJECTION, SOLUTION SUBCUTANEOUS NIGHTLY
COMMUNITY
Start: 2025-01-23

## 2025-01-23 NOTE — TELEPHONE ENCOUNTER
----- Message from Shayna Zelaya sent at 1/23/2025  9:31 AM CST -----  Results reviewed.   Very poorly controlled diabetes.   Worsening kidney function   Triglycerides elevated    What dose of insulin is he currently taking?    He really needs to establish with an endocrinologist in [place]. Would also recommend establishing with a PCP in [place] since he lives there majority of the year

## 2025-01-23 NOTE — TELEPHONE ENCOUNTER
Patient advised. Verbalized understanding.   Patient states he takes 36 units basaglar at night  They are coming back to [place] in April  States he is not going to schedule with endo in [place]  Gave # for [facility]. Their phone number is [phone].  Told patient to call now to schedule with them to have appointment ready for when they are back. Verbalized understanding  New referral placed for Cheryl

## 2025-01-23 NOTE — TELEPHONE ENCOUNTER
Lab results faxed to patient's cardiologist  [facility] [address] [place] [address]  PHONE: [phone]  FAX: [phone]

## 2025-01-24 ENCOUNTER — TELEPHONE (OUTPATIENT)
Dept: FAMILY MEDICINE CLINIC | Facility: CLINIC | Age: 85
End: 2025-01-24

## 2025-01-24 NOTE — TELEPHONE ENCOUNTER
Patients daughter came in to drop off Power of  of Healthcare document.   Would like a call back regarding last test results from nurse or doctor.       POA copied and sent to scan.

## 2025-01-28 ENCOUNTER — TELEPHONE (OUTPATIENT)
Dept: FAMILY MEDICINE CLINIC | Facility: CLINIC | Age: 85
End: 2025-01-28

## 2025-01-28 NOTE — TELEPHONE ENCOUNTER
Patient states he wants to provide BG numbers for Dr Weiss and has another issue to discuss    Patient reports he increased Basaglar to 38 units 1/24/25.   Also taking glipizide 10 mg BID    Blood sugar readings taking in the morning before eating.   1/24 275  1/25 266  126  310  1/27 249  1/28 300    Patient also has a concern he wants to discuss personally with Dr Weiss.  No information given to RN    Patient agreeable to phone visit. Will need someone to call him to schedule

## 2025-01-28 NOTE — TELEPHONE ENCOUNTER
Patient wants to talk to the nurse regarding his blood sugar numbers.   Also states he wants to talk to the nurse regarding other issues,  would not give me that info   I made rounds today with the treatment team including the hospitalist, residents,  nurses, and discussed the patient's current medical status and discharge  planning needs, and reviewed the chart.    T(C): 36.4 (03-04-21 @ 05:00), Max: 36.4 (03-04-21 @ 05:00)  HR: 100 (03-04-21 @ 08:00) (83 - 111)  BP: 122/61 (03-04-21 @ 05:00) (107/57 - 122/61)  RR: 20 (03-04-21 @ 05:00) (18 - 20)  SpO2: 94% (03-04-21 @ 08:00) (84% - 95%)          I reached out to the patient's health care proxy/ responsible family member-           [    ]  I reached                                    and discussed the patient's medical condition,                   family concerns, and discharge planning           [   x  ]  I left a message with family Ami Odonnell,             [phone]               [     ]  I personally participated in rounds with the medical team and my resident and discussed the case. My resident reached                   family member/ HCP                                under my direction and supervision  and we reviewed the conversation.          [     ]  My resident left a message with family under my direction and supervision           [     ]   My resident attended medical rounds and called the family                [      ]    The following was discussed:  The patient's medical status over the past 24 hrs was reviewed, as well as oxygen needs and medication changes and labs.          [      ]   The following concerns were raised:           [     ] I spent 5-10 minutes on the above discussing medical issues with team members and family and/ or my resident    [     ] I spent 11-20 minutes on the above discussing medical issues with team members and family and/ or my resident    [     ] I spent 21-30 minutes on the above discussing medical issues with team members and family and/ or my resident

## 2025-01-30 NOTE — TELEPHONE ENCOUNTER
Patient wants to have blood work FAXED to endocrine Deja Sands  phone number [phone] at [facility].   Also reviewed his blood sugar recommend to increase insulin to 40units and check bs. He will call back with numbers in few days.

## 2025-01-31 RX ORDER — INSULIN GLARGINE 100 [IU]/ML
40 INJECTION, SOLUTION SUBCUTANEOUS NIGHTLY
COMMUNITY
Start: 2025-01-31

## 2025-02-03 ENCOUNTER — TELEPHONE (OUTPATIENT)
Dept: FAMILY MEDICINE CLINIC | Facility: CLINIC | Age: 85
End: 2025-02-03

## 2025-02-03 NOTE — TELEPHONE ENCOUNTER
Spoke with Lorelei at  [facility] office-they did receive the faxes and the referrals they are behind calling patients to come in so she advised that the patient can call and schedule.    Called and s/w Ed and advised him to give Lorelei a call to schedule with them. He was agreeable and will take care of that.

## 2025-02-13 ENCOUNTER — TELEPHONE (OUTPATIENT)
Dept: FAMILY MEDICINE CLINIC | Facility: CLINIC | Age: 85
End: 2025-02-13

## 2025-02-13 NOTE — TELEPHONE ENCOUNTER
LOV: 10/14/24 for physical    insulin glargine (BASAGLAR KWIKPEN) 100 UNIT/ML Subcutaneous Solution Pen-injector  Inject 40 Units into the skin nightly.        01/31/2025     No future appointments.

## 2025-02-15 RX ORDER — INSULIN GLARGINE 100 [IU]/ML
40 INJECTION, SOLUTION SUBCUTANEOUS NIGHTLY
Qty: 15 ML | Refills: 0 | Status: SHIPPED | OUTPATIENT
Start: 2025-02-15

## 2025-02-24 ENCOUNTER — TELEPHONE (OUTPATIENT)
Dept: FAMILY MEDICINE CLINIC | Facility: CLINIC | Age: 85
End: 2025-02-24

## 2025-02-24 DIAGNOSIS — E78.49 OTHER HYPERLIPIDEMIA: Primary | ICD-10-CM

## 2025-02-24 RX ORDER — FENOFIBRATE 145 MG/1
145 TABLET, COATED ORAL DAILY
Qty: 90 TABLET | Refills: 0 | Status: SHIPPED | OUTPATIENT
Start: 2025-02-24

## 2025-02-24 RX ORDER — ATORVASTATIN CALCIUM 20 MG/1
20 TABLET, FILM COATED ORAL NIGHTLY
Qty: 90 TABLET | Refills: 0 | Status: SHIPPED | OUTPATIENT
Start: 2025-02-24

## 2025-02-24 RX ORDER — LEVOTHYROXINE SODIUM 25 UG/1
25 TABLET ORAL
Qty: 90 TABLET | Refills: 0 | Status: SHIPPED | OUTPATIENT
Start: 2025-02-24

## 2025-02-24 RX ORDER — TRIAMTERENE AND HYDROCHLOROTHIAZIDE 37.5; 25 MG/1; MG/1
1 TABLET ORAL DAILY
Qty: 90 TABLET | Refills: 0 | OUTPATIENT
Start: 2025-02-24

## 2025-02-24 NOTE — TELEPHONE ENCOUNTER
Cholesterol Medication Protocol [id number]/24/2025 10:11 AM   Protocol Details ALT < 80    ALT resulted within past year    Lipid panel within past 12 months    In person appointment or virtual visit in the past 12 mos or appointment in next 3 mos    Medication is active on med list     Request for ATORVASTATIN 20 MG Oral Tab     LOV 10/24/24 with Shayna Zelaya, APRN   Last refill 11/14/2024 - 90 tablets 0 refill   No future appointments.    Labs 1/23/25

## 2025-02-24 NOTE — TELEPHONE ENCOUNTER
Thyroid Medication Protocol [id number]/24/2025 10:11 AM   Protocol Details Medication is active on med list    TSH in past 12 months    Last TSH value is normal    In person appointment or virtual v          Hypertension Medications Protocol Failed02/24/2025 10:11 AM   Protocol Details EGFRCR or GFRNAA > 50    CMP or BMP in past 12 months    Last BP reading less than 140/90    In person appointment or virtual visit in the past 12 mos or appointment in next 3 mos    Medication is active on med list      Last GFR 41 on 1/22/25  Last refill 8/8/24 90 0 refill  Last office visit 10/14/24  No future appointments.

## 2025-02-24 NOTE — TELEPHONE ENCOUNTER
Cholesterol Medication Protocol [id number]/24/2025 10:08 AM   Protocol Details ALT < 80    ALT resulted within past year    Lipid panel within past 12 months    In person appointment or virtual visit in the past 12 mos or appointment in next 3 mos    Medication is active on med list       To be filled at: [facility]pharmacy #[id number] - [place], [place] - [address] Replaced by [facility] AT [facility], [phone], [phone]

## 2025-02-24 NOTE — TELEPHONE ENCOUNTER
Rx refill requested     fenofibrate 145 MG Oral Tab     [id number] [place] - [address] [facility], [phone], [phone]

## 2025-02-27 ENCOUNTER — TELEPHONE (OUTPATIENT)
Dept: FAMILY MEDICINE CLINIC | Facility: CLINIC | Age: 85
End: 2025-02-27

## 2025-02-27 RX ORDER — TRIAMTERENE AND HYDROCHLOROTHIAZIDE 37.5; 25 MG/1; MG/1
1 TABLET ORAL DAILY
Qty: 90 TABLET | Refills: 0 | OUTPATIENT
Start: 2025-02-27

## 2025-02-27 NOTE — TELEPHONE ENCOUNTER
Hypertension Medications Protocol Xtxvjt1402/27/2025 09:06 AM   Protocol Details EGFRCR or GFRNAA > 50    CMP or BMP in past 12 months    Last BP reading less than 140/90    In person appointment or virtual visit in the past 12 mos or appointment in next 3 mos    Medication is active on med list        2/24/25  2:28 PM  Note      Needs to call cardiologist for antihypertensive refill  When is he scheduled with endocrinology?

## 2025-02-27 NOTE — TELEPHONE ENCOUNTER
Patient is on a wait list for an appointment with endo office  Patient has not heard back from endo office  Patient advised to contact his cardiologist regarding refill.  Advised patient we talked about this with his BP med last refill, our office even contacted his cardiology office regarding this issue.   Patient states he will not be contacting his cardiology office for the refill  Patient states if Halima does not refill this he will no longer take it.  Patient does not want this medication from his cardiologist.

## 2025-02-27 NOTE — TELEPHONE ENCOUNTER
Patient's POA- spoke with cardiology office.  Per cardiology office discontinued the triamterene- HCT due to patient's kidney function   Called pharmacy in [place] to notify  Med list updated  Left message for patient to call back to notify of above.

## 2025-02-27 NOTE — TELEPHONE ENCOUNTER
Please reach out to daughter, maybe she can contact his cardiologist  I a not sure if they changed his med/dose/etc

## 2025-02-27 NOTE — TELEPHONE ENCOUNTER
Spoke with patient's POARuth - she will contact patient's cardiologist in [place] regarding refill  She will also try to schedule endo appointment.

## 2025-02-27 NOTE — TELEPHONE ENCOUNTER
Patient's daughter calling asking for DIPKA Still regarding update on patient's health, states she spoke with RN this morning. Endorsed to clinical staff.

## 2025-02-27 NOTE — TELEPHONE ENCOUNTER
Spoke with patient regarding endo appointment  Patient states he is waiting for a call back from endo scheduling

## 2025-03-19 ENCOUNTER — TELEPHONE (OUTPATIENT)
Dept: FAMILY MEDICINE CLINIC | Facility: CLINIC | Age: 85
End: 2025-03-19

## 2025-03-19 NOTE — TELEPHONE ENCOUNTER
Patient called, states Dr Weiss called him yesterday and left a message with his wife, States Dr Weiss wanted to know if he scheduled an appointment with Endocrinology.  Patient states he does not have an appointment with them yet.  He is currently in [place], and will be back next month.

## 2025-03-20 DIAGNOSIS — E11.9 TYPE 2 DIABETES MELLITUS WITHOUT COMPLICATION, WITHOUT LONG-TERM CURRENT USE OF INSULIN (HCC): Primary | ICD-10-CM

## 2025-03-20 NOTE — TELEPHONE ENCOUNTER
Last office visit 10/14/24  Last refilled on 2/15/25 for # 15ml with 0 refills  No future appointments.     Thank you.

## 2025-03-22 RX ORDER — INSULIN GLARGINE 100 [IU]/ML
40 INJECTION, SOLUTION SUBCUTANEOUS NIGHTLY
Refills: 0 | OUTPATIENT
Start: 2025-03-22

## 2025-03-24 RX ORDER — INSULIN GLARGINE 100 [IU]/ML
40 INJECTION, SOLUTION SUBCUTANEOUS NIGHTLY
Qty: 15 ML | Refills: 0 | Status: SHIPPED | OUTPATIENT
Start: 2025-03-24

## 2025-03-24 NOTE — TELEPHONE ENCOUNTER
Daughter Ruth advised. Verbalized understanding.     [facility]. Their phone number is [phone].

## 2025-03-24 NOTE — TELEPHONE ENCOUNTER
I have not tried calling him. Also do recommend he need to see endocrine since his blood sugars are out of control.

## 2025-04-12 DIAGNOSIS — E11.9 TYPE 2 DIABETES MELLITUS WITHOUT COMPLICATION, WITHOUT LONG-TERM CURRENT USE OF INSULIN (HCC): ICD-10-CM

## 2025-04-12 NOTE — TELEPHONE ENCOUNTER
LOV: 10/14/24 for phsyical    glipiZIDE 10 MG Oral Tab  TAKE 1 TABLET (10 MG TOTAL) BY MOUTH IN THE MORNING AND BEFORE BEDTIME Dispense: 180 tablet, Refills: 1 ordered        07/15/2024     No future appointments.  Please advise

## 2025-04-14 ENCOUNTER — TELEPHONE (OUTPATIENT)
Dept: FAMILY MEDICINE CLINIC | Facility: CLINIC | Age: 85
End: 2025-04-14

## 2025-04-14 RX ORDER — GLIPIZIDE 10 MG/1
10 TABLET ORAL 2 TIMES DAILY
Qty: 180 TABLET | Refills: 1 | Status: SHIPPED | OUTPATIENT
Start: 2025-04-14

## 2025-04-14 NOTE — TELEPHONE ENCOUNTER
Rx refill requested     glipiZIDE 10 MG Oral Tab      [id number] [place] - [address] [facility], [phone], [phone]

## 2025-04-28 DIAGNOSIS — E11.9 TYPE 2 DIABETES MELLITUS WITHOUT COMPLICATION, WITHOUT LONG-TERM CURRENT USE OF INSULIN (HCC): ICD-10-CM

## 2025-04-28 NOTE — TELEPHONE ENCOUNTER
Last office visit 10/14/24  Last refilled on 3/24/25 for # 15ml with 0 refills  No future appointments.     Thank you.

## 2025-05-03 RX ORDER — INSULIN GLARGINE 100 [IU]/ML
40 INJECTION, SOLUTION SUBCUTANEOUS NIGHTLY
Qty: 15 ML | Refills: 0 | Status: SHIPPED | OUTPATIENT
Start: 2025-05-03

## 2025-05-13 ENCOUNTER — OFFICE VISIT (OUTPATIENT)
Dept: FAMILY MEDICINE CLINIC | Facility: CLINIC | Age: 85
End: 2025-05-13
Payer: MEDICARE

## 2025-05-13 VITALS
DIASTOLIC BLOOD PRESSURE: 70 MMHG | HEIGHT: 71 IN | BODY MASS INDEX: 28.42 KG/M2 | HEART RATE: 97 BPM | OXYGEN SATURATION: 99 % | RESPIRATION RATE: 18 BRPM | SYSTOLIC BLOOD PRESSURE: 118 MMHG | WEIGHT: 203 LBS | TEMPERATURE: 97 F

## 2025-05-13 DIAGNOSIS — E11.9 TYPE 2 DIABETES MELLITUS WITHOUT COMPLICATION, WITHOUT LONG-TERM CURRENT USE OF INSULIN (HCC): Primary | ICD-10-CM

## 2025-05-13 DIAGNOSIS — E78.49 OTHER HYPERLIPIDEMIA: ICD-10-CM

## 2025-05-13 PROCEDURE — 99214 OFFICE O/P EST MOD 30 MIN: CPT | Performed by: FAMILY MEDICINE

## 2025-05-13 RX ORDER — LATANOPROST 50 UG/ML
1 SOLUTION/ DROPS OPHTHALMIC NIGHTLY
COMMUNITY
Start: 2025-04-03

## 2025-05-13 RX ORDER — AMLODIPINE AND BENAZEPRIL HYDROCHLORIDE 5; 20 MG/1; MG/1
1 CAPSULE ORAL DAILY
COMMUNITY

## 2025-05-13 RX ORDER — ASPIRIN 325 MG
325 TABLET ORAL DAILY
COMMUNITY

## 2025-05-13 NOTE — PROGRESS NOTES
No chief complaint on file.     Discussed diabetes    HPI:    Patient ID: Shravan Castellano is a 84 year old male.    HPI patient is here for diabetes follow-up.  Last hemoglobin A1c was elevated.  He does report that he checks his blood sugars and recently has noticed that they are in the 200 range.  Did recommend he needs to see endocrinologist since his A1c was 11.9.  He wants to get his blood work done first and then decide an endocrinologist.  He has not seen endocrinologist in [place].  He did see his cardiologist in November and was told everything is okay.    Review of Systems  Negative chest pain shortness of breath tingling numbness      Current Medications[1]  Allergies:Allergies[2]    HISTORY:  Past Medical History[3]   Past Surgical History[4]   Family History[5]   Social History: Short Social Hx on File[6]     PHYSICAL EXAM:    /70 (BP Location: Left arm, Patient Position: Sitting, Cuff Size: adult)   Pulse 97   Temp 97.2 °F (36.2 °C) (Temporal)   Resp 18   Ht 5' 11\" (1.803 m)   Wt 203 lb (92.1 kg)   SpO2 99%   BMI 28.31 kg/m²    Physical Exam  Constitutional:       General: He is not in acute distress.     Appearance: He is not ill-appearing.   Cardiovascular:      Rate and Rhythm: Normal rate and regular rhythm.      Pulses: Normal pulses.      Heart sounds: Normal heart sounds.   Neurological:      Mental Status: He is alert.              ASSESSMENT/PLAN:   1. Type 2 diabetes mellitus without complication, without long-term current use of insulin (HCC)  Labs ordered  Continue with insulin Lantus 40 units before bedtime and glyburide.  He is not taking metformin right now.  Once again hemoglobin A1c then we will go ahead and have him see endocrinologist over here.  He does report that he is going on vacation for a few weeks so did recommend that he needs to call the endocrinologist to make appointment depending on his schedule.  - CBC, Platelet, No Differential [E]; Future  - Comp  Metabolic Panel (14); Future  - TSH W Reflex To Free T4; Future  - Lipid Panel; Future  - Hemoglobin A1C [E]; Future    2. Other hyperlipidemia  Labs ordered  - Lipid Panel; Future             No follow-ups on file.            [1]   Current Outpatient Medications   Medication Sig Dispense Refill    amLODIPine Besy-Benazepril HCl 5-20 MG Oral Cap Take 1 capsule by mouth daily.      aspirin 325 MG Oral Tab Take 1 tablet (325 mg total) by mouth daily.      latanoprost 0.005 % Ophthalmic Solution Place 1 drop into both eyes nightly.      insulin glargine (BASAGLAR KWIKPEN) 100 UNIT/ML Subcutaneous Solution Pen-injector Inject 40 Units into the skin nightly. 15 mL 0    GLIPIZIDE 10 MG Oral Tab TAKE 1 TABLET (10 MG TOTAL) BY MOUTH IN THE MORNING AND BEFORE BEDTIME 180 tablet 1    fenofibrate 145 MG Oral Tab Take 1 tablet (145 mg total) by mouth daily. 90 tablet 0    ATORVASTATIN 20 MG Oral Tab TAKE 1 TABLET BY MOUTH EVERYDAY AT BEDTIME 90 tablet 0    LEVOTHYROXINE 25 MCG Oral Tab TAKE 1 TABLET BY MOUTH BEFORE BREAKFAST. 90 tablet 0    AMLODIPINE 5 MG Oral Tab TAKE 1 TABLET (5 MG TOTAL) BY MOUTH DAILY. 90 tablet 0    Insulin Pen Needle (BD PEN NEEDLE ORIGINAL U/F) 29G X 12.7MM Does not apply Misc INJECT 10 UNITS INTO THE SKIN NIGHTLY. - SUBCUTANEOUS 300 each 0    LISINOPRIL 2.5 MG Oral Tab TAKE 1 TABLET BY MOUTH EVERY DAY 90 tablet 0    metoprolol succinate ER 50 MG Oral Tablet 24 Hr Take 1 tablet (50 mg total) by mouth daily. 90 tablet 3    isosorbide mononitrate ER 30 MG Oral Tablet 24 Hr Take 1 tablet (30 mg total) by mouth daily.      ferrous sulfate 325 (65 FE) MG Oral Tab EC Take 1 tablet (325 mg total) by mouth daily with breakfast.      Omega-3 Fatty Acids (FISH OIL) 1200 MG Oral Cap Take by mouth.      Multiple Vitamin (MULTI-DAY) Oral Tab Take by mouth.     [2] No Known Allergies  [3] No past medical history on file.  [4] No past surgical history on file.  [5] No family history on file.  [6]   Social  History  Socioeconomic History    Marital status:    Tobacco Use    Smoking status: Never     Passive exposure: Past    Smokeless tobacco: Never   Vaping Use    Vaping status: Never Used   Substance and Sexual Activity    Alcohol use: Not Currently    Drug use: Not Currently     Social Drivers of Health     Food Insecurity: No Food Insecurity (5/13/2025)    NCSS - Food Insecurity     Worried About Running Out of Food in the Last Year: No     Ran Out of Food in the Last Year: No   Transportation Needs: No Transportation Needs (5/13/2025)    NCSS - Transportation     Lack of Transportation: No   Housing Stability: Not At Risk (5/13/2025)    NCSS - Housing/Utilities     Has Housing: Yes     Worried About Losing Housing: No     Unable to Get Utilities: No

## 2025-05-14 ENCOUNTER — LAB ENCOUNTER (OUTPATIENT)
Dept: LAB | Age: 85
End: 2025-05-14
Attending: FAMILY MEDICINE
Payer: MEDICARE

## 2025-05-14 DIAGNOSIS — E78.49 OTHER HYPERLIPIDEMIA: ICD-10-CM

## 2025-05-14 DIAGNOSIS — E11.9 TYPE 2 DIABETES MELLITUS WITHOUT COMPLICATION, WITHOUT LONG-TERM CURRENT USE OF INSULIN (HCC): ICD-10-CM

## 2025-05-14 LAB
ALBUMIN SERPL-MCNC: 4.8 G/DL (ref 3.2–4.8)
ALBUMIN/GLOB SERPL: 2.1 {RATIO} (ref 1–2)
ALP LIVER SERPL-CCNC: 71 U/L (ref 45–117)
ALT SERPL-CCNC: 31 U/L (ref 10–49)
ANION GAP SERPL CALC-SCNC: 8 MMOL/L (ref 0–18)
AST SERPL-CCNC: 25 U/L (ref ?–34)
BILIRUB SERPL-MCNC: 1 MG/DL (ref 0.2–1.1)
BUN BLD-MCNC: 24 MG/DL (ref 9–23)
CALCIUM BLD-MCNC: 10.1 MG/DL (ref 8.7–10.6)
CHLORIDE SERPL-SCNC: 101 MMOL/L (ref 98–112)
CHOLEST SERPL-MCNC: 129 MG/DL (ref ?–200)
CO2 SERPL-SCNC: 28 MMOL/L (ref 21–32)
CREAT BLD-MCNC: 1.59 MG/DL (ref 0.7–1.3)
EGFRCR SERPLBLD CKD-EPI 2021: 43 ML/MIN/1.73M2 (ref 60–?)
ERYTHROCYTE [DISTWIDTH] IN BLOOD BY AUTOMATED COUNT: 13.1 %
EST. AVERAGE GLUCOSE BLD GHB EST-MCNC: 278 MG/DL (ref 68–126)
FASTING PATIENT LIPID ANSWER: YES
FASTING STATUS PATIENT QL REPORTED: YES
GLOBULIN PLAS-MCNC: 2.3 G/DL (ref 2–3.5)
GLUCOSE BLD-MCNC: 237 MG/DL (ref 70–99)
HBA1C MFR BLD: 11.3 % (ref ?–5.7)
HCT VFR BLD AUTO: 44.3 % (ref 39–53)
HDLC SERPL-MCNC: 36 MG/DL (ref 40–59)
HGB BLD-MCNC: 15.5 G/DL (ref 13–17.5)
LDLC SERPL CALC-MCNC: 68 MG/DL (ref ?–100)
MCH RBC QN AUTO: 30.2 PG (ref 26–34)
MCHC RBC AUTO-ENTMCNC: 35 G/DL (ref 31–37)
MCV RBC AUTO: 86.4 FL (ref 80–100)
NONHDLC SERPL-MCNC: 93 MG/DL (ref ?–130)
OSMOLALITY SERPL CALC.SUM OF ELEC: 296 MOSM/KG (ref 275–295)
PLATELET # BLD AUTO: 153 10(3)UL (ref 150–450)
POTASSIUM SERPL-SCNC: 4.7 MMOL/L (ref 3.5–5.1)
PROT SERPL-MCNC: 7.1 G/DL (ref 5.7–8.2)
RBC # BLD AUTO: 5.13 X10(6)UL (ref 3.8–5.8)
SODIUM SERPL-SCNC: 137 MMOL/L (ref 136–145)
TRIGL SERPL-MCNC: 145 MG/DL (ref 30–149)
TSI SER-ACNC: 4.47 UIU/ML (ref 0.55–4.78)
VLDLC SERPL CALC-MCNC: 22 MG/DL (ref 0–30)
WBC # BLD AUTO: 4.1 X10(3) UL (ref 4–11)

## 2025-05-14 PROCEDURE — 85027 COMPLETE CBC AUTOMATED: CPT

## 2025-05-14 PROCEDURE — 80053 COMPREHEN METABOLIC PANEL: CPT

## 2025-05-14 PROCEDURE — 80061 LIPID PANEL: CPT

## 2025-05-14 PROCEDURE — 36415 COLL VENOUS BLD VENIPUNCTURE: CPT

## 2025-05-14 PROCEDURE — 83036 HEMOGLOBIN GLYCOSYLATED A1C: CPT

## 2025-05-14 PROCEDURE — 84443 ASSAY THYROID STIM HORMONE: CPT

## 2025-05-15 RX ORDER — ATORVASTATIN CALCIUM 20 MG/1
20 TABLET, FILM COATED ORAL NIGHTLY
Qty: 90 TABLET | Refills: 0 | Status: SHIPPED | OUTPATIENT
Start: 2025-05-15

## 2025-05-15 NOTE — TELEPHONE ENCOUNTER
Rx refill requested     ATORVASTATIN 20 MG Oral Tab     [id number] - [place], [place] - [address] -[phone], [phone]

## 2025-05-19 ENCOUNTER — TELEPHONE (OUTPATIENT)
Dept: FAMILY MEDICINE CLINIC | Facility: CLINIC | Age: 85
End: 2025-05-19

## 2025-05-19 DIAGNOSIS — E11.9 TYPE 2 DIABETES MELLITUS WITHOUT COMPLICATION, WITHOUT LONG-TERM CURRENT USE OF INSULIN (HCC): Primary | ICD-10-CM

## 2025-05-19 NOTE — TELEPHONE ENCOUNTER
Talk with patient wife regarding his labs hemoglobin A1c is 11.3. he was also there.   Other labs shows normal cholesterol thyroid blood count.  Kidney function has been stable.  Did recommend to see [facility] phone number given.   Referral given  Also will increase her Lantus from 40 to 42 but highly recommend he needs to see endocrine.  Verbalized understanding

## 2025-05-20 ENCOUNTER — TELEPHONE (OUTPATIENT)
Dept: FAMILY MEDICINE CLINIC | Facility: CLINIC | Age: 85
End: 2025-05-20

## 2025-05-20 RX ORDER — PEN NEEDLE, DIABETIC 29 G X1/2"
NEEDLE, DISPOSABLE MISCELLANEOUS
Qty: 300 EACH | Refills: 0 | Status: SHIPPED | OUTPATIENT
Start: 2025-05-20

## 2025-05-20 NOTE — TELEPHONE ENCOUNTER
Wife called said that patient lost his needles fro his insuline would like to get it refilled per patient wife ok if not covered by insurance.   Needs to got to [place] [facility].  Would like a call to know when they are sent      Rx refill requested    Insulin Pen Needle (BD PEN NEEDLE ORIGINAL U/F) 29G X 12.7MM Does not apply [id number] [place], [place] - [address] [place] [address] [phone], [phone] [[id number]]

## 2025-06-06 DIAGNOSIS — E11.9 TYPE 2 DIABETES MELLITUS WITHOUT COMPLICATION, WITHOUT LONG-TERM CURRENT USE OF INSULIN (HCC): ICD-10-CM

## 2025-06-06 DIAGNOSIS — E78.49 OTHER HYPERLIPIDEMIA: ICD-10-CM

## 2025-06-06 NOTE — TELEPHONE ENCOUNTER
LOV: 5/13/25 type 2 diabetes     fenofibrate 145 MG Oral Tab  Take 1 tablet (145 mg total) by mouth daily. Dispense: 90 tablet, Refills: 0 ordered        02/24/2025     metoprolol succinate ER 50 MG Oral Tablet 24 Hr  Take 1 tablet (50 mg total) by mouth daily. Dispense: 90 tablet, Refills: 3 ordered        06/12/2024     No future appointments.  Please advise

## 2025-06-06 NOTE — TELEPHONE ENCOUNTER
LOV: 5/13/25    LISINOPRIL 2.5 MG Oral Tab  TAKE 1 TABLET BY MOUTH EVERY DAY Dispense: 90 tablet, Refills: 0 ordered        08/08/2024     No future appointments.  Please advise

## 2025-06-09 RX ORDER — FENOFIBRATE 145 MG/1
145 TABLET, FILM COATED ORAL DAILY
Qty: 90 TABLET | Refills: 0 | Status: SHIPPED | OUTPATIENT
Start: 2025-06-09

## 2025-06-09 RX ORDER — METOPROLOL SUCCINATE 50 MG/1
50 TABLET, EXTENDED RELEASE ORAL DAILY
Qty: 90 TABLET | Refills: 0 | Status: SHIPPED | OUTPATIENT
Start: 2025-06-09

## 2025-06-09 RX ORDER — LISINOPRIL 2.5 MG/1
2.5 TABLET ORAL DAILY
Qty: 90 TABLET | Refills: 0 | Status: SHIPPED | OUTPATIENT
Start: 2025-06-09

## 2025-06-09 NOTE — TELEPHONE ENCOUNTER
Please inform him he need to see endocrine. I have already placed referral for [facility] and also given phone number but he need to call there office and make appt. I don't see he is scheduled yet.

## 2025-06-09 NOTE — TELEPHONE ENCOUNTER
Spoke with wife - encouraged patient to call the diabetic educator.    Wife states will give patient message but doesn't think he will call.

## 2025-06-19 RX ORDER — LEVOTHYROXINE SODIUM 25 UG/1
25 TABLET ORAL
Qty: 90 TABLET | Refills: 0 | Status: SHIPPED | OUTPATIENT
Start: 2025-06-19

## 2025-06-19 NOTE — TELEPHONE ENCOUNTER
Thyroid Medication Protocol Passed     Patient requesting refill for: levothyroxine 25 MCG Oral Tab     Last office visit 05/13/2025  Last refilled on 02/24/2025 for # 90 tablet with 0 refills  No future appointments.     Thank you.

## 2025-06-19 NOTE — TELEPHONE ENCOUNTER
Rx refill requested     LEVOTHYROXINE 25 MCG Oral Tab     [id number] - [place], [place] - [address] [phone], [phone]

## 2025-06-20 RX ORDER — LEVOTHYROXINE SODIUM 25 UG/1
25 TABLET ORAL
Qty: 90 TABLET | Refills: 0 | OUTPATIENT
Start: 2025-06-20

## 2025-08-11 RX ORDER — ATORVASTATIN CALCIUM 20 MG/1
20 TABLET, FILM COATED ORAL NIGHTLY
Qty: 90 TABLET | Refills: 0 | Status: SHIPPED | OUTPATIENT
Start: 2025-08-11

## (undated) NOTE — LETTER
03/20/23    [address]           Dear Aracelis Knowles     Our records indicate that you have outstanding lab work and/or testing that was ordered for you and has not yet been completed:  Lab Frequency Next Occurrence   HEMOGLOBIN A1C Once 01/03/2023     To provide you with the best possible care, please complete these orders at your earliest convenience. If you have recently completed these orders please disregard this letter. To schedule please call Central Scheduling at [phone]. If you have any questions please call the office at [phone]. *If you prefer to use [employer] for your labs please let us know so we can fax your orders.     Thank you,    [facility]

## (undated) NOTE — LETTER
09/29/23    [address]           Dear Alfredo Tai     Our records indicate that you have outstanding lab work and/or testing that was ordered for you and has not yet been completed:  Lab Frequency Next Occurrence                       Assay, Thyroid Stim Hormone Once 10/01/2023      To provide you with the best possible care, please complete these orders at your earliest convenience. If you have recently completed these orders please disregard this letter. To schedule please call Central Scheduling at [phone]. If you have any questions please call the office at [phone]. *If you prefer to use [email or web address] for your labs please let us know so we can fax your orders.     Thank you,    [facility]